# Patient Record
Sex: FEMALE | Race: ASIAN | NOT HISPANIC OR LATINO | Employment: FULL TIME | URBAN - METROPOLITAN AREA
[De-identification: names, ages, dates, MRNs, and addresses within clinical notes are randomized per-mention and may not be internally consistent; named-entity substitution may affect disease eponyms.]

---

## 2019-08-11 ENCOUNTER — APPOINTMENT (OUTPATIENT)
Dept: OBGYN | Facility: MEDICAL CENTER | Age: 39
End: 2019-08-11
Attending: OBSTETRICS & GYNECOLOGY
Payer: COMMERCIAL

## 2019-08-11 ENCOUNTER — ANESTHESIA (OUTPATIENT)
Dept: ANESTHESIOLOGY | Facility: MEDICAL CENTER | Age: 39
End: 2019-08-11
Payer: COMMERCIAL

## 2019-08-11 ENCOUNTER — HOSPITAL ENCOUNTER (INPATIENT)
Facility: MEDICAL CENTER | Age: 39
LOS: 4 days | End: 2019-08-15
Attending: OBSTETRICS & GYNECOLOGY | Admitting: OBSTETRICS & GYNECOLOGY
Payer: COMMERCIAL

## 2019-08-11 ENCOUNTER — ANESTHESIA EVENT (OUTPATIENT)
Dept: ANESTHESIOLOGY | Facility: MEDICAL CENTER | Age: 39
End: 2019-08-11
Payer: COMMERCIAL

## 2019-08-11 PROBLEM — I10 HTN (HYPERTENSION): Status: ACTIVE | Noted: 2019-08-11

## 2019-08-11 LAB
ABO GROUP BLD: NORMAL
ALBUMIN SERPL BCP-MCNC: 3.3 G/DL (ref 3.2–4.9)
ALBUMIN/GLOB SERPL: 1.1 G/DL
ALP SERPL-CCNC: 152 U/L (ref 30–99)
ALT SERPL-CCNC: 21 U/L (ref 2–50)
ANION GAP SERPL CALC-SCNC: 10 MMOL/L (ref 0–11.9)
AST SERPL-CCNC: 30 U/L (ref 12–45)
BASOPHILS # BLD AUTO: 0.2 % (ref 0–1.8)
BASOPHILS # BLD: 0.02 K/UL (ref 0–0.12)
BILIRUB SERPL-MCNC: 0.2 MG/DL (ref 0.1–1.5)
BUN SERPL-MCNC: 13 MG/DL (ref 8–22)
C TRACH DNA GENITAL QL NAA+PROBE: NEGATIVE
CALCIUM SERPL-MCNC: 9.8 MG/DL (ref 8.5–10.5)
CHLORIDE SERPL-SCNC: 108 MMOL/L (ref 96–112)
CO2 SERPL-SCNC: 18 MMOL/L (ref 20–33)
CREAT SERPL-MCNC: 0.66 MG/DL (ref 0.5–1.4)
EOSINOPHIL # BLD AUTO: 0.07 K/UL (ref 0–0.51)
EOSINOPHIL NFR BLD: 0.8 % (ref 0–6.9)
ERYTHROCYTE [DISTWIDTH] IN BLOOD BY AUTOMATED COUNT: 43.2 FL (ref 35.9–50)
GLOBULIN SER CALC-MCNC: 3 G/DL (ref 1.9–3.5)
GLUCOSE SERPL-MCNC: 108 MG/DL (ref 65–99)
HBV SURFACE AG SERPL QL IA: NEGATIVE
HCT VFR BLD AUTO: 37.2 % (ref 37–47)
HGB BLD-MCNC: 12.5 G/DL (ref 12–16)
HIV 1+2 AB+HIV1 P24 AG SERPL QL IA: NON REACTIVE
HOLDING TUBE BB 8507: NORMAL
IMM GRANULOCYTES # BLD AUTO: 0.07 K/UL (ref 0–0.11)
IMM GRANULOCYTES NFR BLD AUTO: 0.8 % (ref 0–0.9)
LYMPHOCYTES # BLD AUTO: 1.34 K/UL (ref 1–4.8)
LYMPHOCYTES NFR BLD: 15.6 % (ref 22–41)
MCH RBC QN AUTO: 30.7 PG (ref 27–33)
MCHC RBC AUTO-ENTMCNC: 33.6 G/DL (ref 33.6–35)
MCV RBC AUTO: 91.4 FL (ref 81.4–97.8)
MONOCYTES # BLD AUTO: 0.84 K/UL (ref 0–0.85)
MONOCYTES NFR BLD AUTO: 9.8 % (ref 0–13.4)
N GONORRHOEA DNA GENITAL QL NAA+PROBE: NEGATIVE
NEUTROPHILS # BLD AUTO: 6.24 K/UL (ref 2–7.15)
NEUTROPHILS NFR BLD: 72.8 % (ref 44–72)
NRBC # BLD AUTO: 0 K/UL
NRBC BLD-RTO: 0 /100 WBC
PLATELET # BLD AUTO: 166 K/UL (ref 164–446)
PMV BLD AUTO: 10.9 FL (ref 9–12.9)
POTASSIUM SERPL-SCNC: 3.5 MMOL/L (ref 3.6–5.5)
PROT SERPL-MCNC: 6.3 G/DL (ref 6–8.2)
RBC # BLD AUTO: 4.07 M/UL (ref 4.2–5.4)
RH BLD: NORMAL
RUBV IGG SERPL IA-ACNC: NORMAL
SODIUM SERPL-SCNC: 136 MMOL/L (ref 135–145)
STREP GP B DNA PCR: NEGATIVE
TREPONEMA PALLIDUM IGG+IGM AB [PRESENCE] IN SERUM OR PLASMA BY IMMUNOASSAY: NON REACTIVE
URATE SERPL-MCNC: 5.5 MG/DL (ref 1.9–8.2)
WBC # BLD AUTO: 8.6 K/UL (ref 4.8–10.8)

## 2019-08-11 PROCEDURE — 80053 COMPREHEN METABOLIC PANEL: CPT

## 2019-08-11 PROCEDURE — 770002 HCHG ROOM/CARE - OB PRIVATE (112)

## 2019-08-11 PROCEDURE — 700102 HCHG RX REV CODE 250 W/ 637 OVERRIDE(OP): Performed by: OBSTETRICS & GYNECOLOGY

## 2019-08-11 PROCEDURE — 85025 COMPLETE CBC W/AUTO DIFF WBC: CPT

## 2019-08-11 PROCEDURE — A9270 NON-COVERED ITEM OR SERVICE: HCPCS | Performed by: OBSTETRICS & GYNECOLOGY

## 2019-08-11 PROCEDURE — 84550 ASSAY OF BLOOD/URIC ACID: CPT

## 2019-08-11 PROCEDURE — 36415 COLL VENOUS BLD VENIPUNCTURE: CPT

## 2019-08-11 RX ORDER — IBUPROFEN 600 MG/1
600 TABLET ORAL EVERY 6 HOURS PRN
Status: DISCONTINUED | OUTPATIENT
Start: 2019-08-11 | End: 2019-08-15 | Stop reason: HOSPADM

## 2019-08-11 RX ORDER — ONDANSETRON 4 MG/1
4 TABLET, ORALLY DISINTEGRATING ORAL EVERY 6 HOURS PRN
Status: DISCONTINUED | OUTPATIENT
Start: 2019-08-11 | End: 2019-08-15 | Stop reason: HOSPADM

## 2019-08-11 RX ORDER — OXYCODONE AND ACETAMINOPHEN 10; 325 MG/1; MG/1
1 TABLET ORAL EVERY 4 HOURS PRN
Status: DISCONTINUED | OUTPATIENT
Start: 2019-08-11 | End: 2019-08-15 | Stop reason: HOSPADM

## 2019-08-11 RX ORDER — DEXTROSE, SODIUM CHLORIDE, SODIUM LACTATE, POTASSIUM CHLORIDE, AND CALCIUM CHLORIDE 5; .6; .31; .03; .02 G/100ML; G/100ML; G/100ML; G/100ML; G/100ML
INJECTION, SOLUTION INTRAVENOUS CONTINUOUS
Status: DISCONTINUED | OUTPATIENT
Start: 2019-08-12 | End: 2019-08-15 | Stop reason: HOSPADM

## 2019-08-11 RX ORDER — OXYCODONE HYDROCHLORIDE AND ACETAMINOPHEN 5; 325 MG/1; MG/1
1 TABLET ORAL EVERY 4 HOURS PRN
Status: DISCONTINUED | OUTPATIENT
Start: 2019-08-11 | End: 2019-08-15 | Stop reason: HOSPADM

## 2019-08-11 RX ORDER — NIFEDIPINE 30 MG/1
30 TABLET, EXTENDED RELEASE ORAL
Refills: 2 | Status: ON HOLD | COMMUNITY
Start: 2019-07-30 | End: 2019-08-14

## 2019-08-11 RX ORDER — MISOPROSTOL 200 UG/1
800 TABLET ORAL
Status: DISCONTINUED | OUTPATIENT
Start: 2019-08-11 | End: 2019-08-13 | Stop reason: HOSPADM

## 2019-08-11 RX ORDER — SODIUM CHLORIDE, SODIUM LACTATE, POTASSIUM CHLORIDE, CALCIUM CHLORIDE 600; 310; 30; 20 MG/100ML; MG/100ML; MG/100ML; MG/100ML
INJECTION, SOLUTION INTRAVENOUS CONTINUOUS
Status: ACTIVE | OUTPATIENT
Start: 2019-08-12 | End: 2019-08-12

## 2019-08-11 RX ORDER — ONDANSETRON 2 MG/ML
4 INJECTION INTRAMUSCULAR; INTRAVENOUS EVERY 6 HOURS PRN
Status: DISCONTINUED | OUTPATIENT
Start: 2019-08-11 | End: 2019-08-15 | Stop reason: HOSPADM

## 2019-08-11 RX ADMIN — MISOPROSTOL 25 MCG: 100 TABLET ORAL at 21:34

## 2019-08-11 ASSESSMENT — LIFESTYLE VARIABLES
TOTAL SCORE: 0
TOTAL SCORE: 0
HAVE PEOPLE ANNOYED YOU BY CRITICIZING YOUR DRINKING: NO
HAVE YOU EVER FELT YOU SHOULD CUT DOWN ON YOUR DRINKING: NO
EVER HAD A DRINK FIRST THING IN THE MORNING TO STEADY YOUR NERVES TO GET RID OF A HANGOVER: NO
TOTAL SCORE: 0
EVER FELT BAD OR GUILTY ABOUT YOUR DRINKING: NO
ALCOHOL_USE: NO
CONSUMPTION TOTAL: NEGATIVE
ON A TYPICAL DAY WHEN YOU DRINK ALCOHOL HOW MANY DRINKS DO YOU HAVE: 0
AVERAGE NUMBER OF DAYS PER WEEK YOU HAVE A DRINK CONTAINING ALCOHOL: 0
EVER_SMOKED: NEVER
HOW MANY TIMES IN THE PAST YEAR HAVE YOU HAD 5 OR MORE DRINKS IN A DAY: 0

## 2019-08-11 ASSESSMENT — COPD QUESTIONNAIRES
COPD SCREENING SCORE: 0
IN THE PAST 12 MONTHS DO YOU DO LESS THAN YOU USED TO BECAUSE OF YOUR BREATHING PROBLEMS: DISAGREE/UNSURE
DO YOU EVER COUGH UP ANY MUCUS OR PHLEGM?: NO/ONLY WITH OCCASIONAL COLDS OR INFECTIONS
DURING THE PAST 4 WEEKS HOW MUCH DID YOU FEEL SHORT OF BREATH: NONE/LITTLE OF THE TIME
HAVE YOU SMOKED AT LEAST 100 CIGARETTES IN YOUR ENTIRE LIFE: NO/DON'T KNOW

## 2019-08-11 ASSESSMENT — PATIENT HEALTH QUESTIONNAIRE - PHQ9
1. LITTLE INTEREST OR PLEASURE IN DOING THINGS: NOT AT ALL
2. FEELING DOWN, DEPRESSED, IRRITABLE, OR HOPELESS: NOT AT ALL
SUM OF ALL RESPONSES TO PHQ9 QUESTIONS 1 AND 2: 0

## 2019-08-11 NOTE — H&P
DATE OF PLANNED ADMISSION:  2019    ADMISSION DIAGNOSES:  Intrauterine pregnancy at term with chronic hypertension   for induction of labor.    HISTORY OF PRESENT ILLNESS:  This is a 38-year-old  who has an estimated   date of confinement of  based on a first trimester ultrasound.  The   patient is currently without complaints.  She reports good fetal movement, no   contractions, no vaginal bleeding or loss of fluid.    REVIEW OF SYSTEMS:  Negative for nausea, vomiting, headaches, fevers, chest   pains or shortness of breath.    PAST MEDICAL HISTORY:  Chronic hypertension.    PAST SURGICAL HISTORY:  None.    MEDICATIONS:  The patient is currently taking Procardia 30 mg XL daily as well   as a baby aspirin per day.    ALLERGIES:  SULFA.    SOCIAL HISTORY:  The patient denies tobacco, ethanol, or drugs.    PHYSICAL EXAMINATION:  GENERAL:  This is a well-developed, well-nourished female, in no apparent   distress.  VITAL SIGNS:  Blood pressure 102/70, weight 185 pounds.  HEART:  Regular rate and rhythm.  LUNGS:  Clear.  ABDOMEN:  Gravid with a fundal height of 38 cm.  EXTREMITIES:  Show no clubbing, cyanosis, or edema.  Most recent cervical exam   0, thick, and -2 station.    PERTINENT PRENATAL LABS:  The patient's blood type is A positive, antibody   screen negative, hepatitis negative, rubella immune, RPR negative, HIV   negative, group B strep is negative.  One-hour Glucola was normal at 133.    ASSESSMENT AND PLAN:  This is a 38-year-old G1 who is at 38+ weeks' gestation,   to be admitted for induction of labor secondary to chronic hypertension.  The   patient understands indications for induction and understands risks and   benefits.         ____________________________________     MD KENYON Rivero / GAURI    DD:  2019 13:33:33  DT:  2019 14:55:21    D#:  [id number]  Job#:  [id number]

## 2019-08-12 PROCEDURE — 500726 HCHG BAKRI TAPENADE BALLOON

## 2019-08-12 PROCEDURE — 770002 HCHG ROOM/CARE - OB PRIVATE (112)

## 2019-08-12 PROCEDURE — 700102 HCHG RX REV CODE 250 W/ 637 OVERRIDE(OP): Performed by: OBSTETRICS & GYNECOLOGY

## 2019-08-12 PROCEDURE — A9270 NON-COVERED ITEM OR SERVICE: HCPCS | Performed by: OBSTETRICS & GYNECOLOGY

## 2019-08-12 RX ADMIN — MISOPROSTOL 25 MCG: 100 TABLET ORAL at 01:36

## 2019-08-12 RX ADMIN — MISOPROSTOL 25 MCG: 100 TABLET ORAL at 05:59

## 2019-08-12 NOTE — PROGRESS NOTES
Comfortable  Cat one current but prev 2  freequent uc post cytotec  By my exam cl/th/high  /66   Pulse 76   Resp 16   Wt 83.9 kg (185 lb)   Dw exam and prev decel and cvx and reg uc and will recheck in hr and reassured re stable bps

## 2019-08-12 NOTE — ANESTHESIA PREPROCEDURE EVALUATION
37yo  at 38 1/7 weeks, here for IOL for chronic HTN; in labor pain and requesting epidural for pain control    Relevant Problems   CARDIAC   (+) HTN (hypertension) (takes nifedipine and baby ASA)       Physical Exam    Airway   Mallampati: II  TM distance: >3 FB  Neck ROM: full       Cardiovascular - normal exam  Rhythm: regular  Rate: normal  (-) murmur     Dental - normal exam           Pulmonary - normal exam  Breath sounds clear to auscultation     Abdominal    Neurological - normal exam               Anesthesia Plan    ASA 3 (HTN in parturient)       Plan - epidural   Neuraxial block will be labor analgesia              Pertinent diagnostic labs and testing reviewed    Informed Consent:    Anesthetic plan and risks discussed with patient.

## 2019-08-12 NOTE — PROGRESS NOTES
0700. Report from Sabi BLANC RN. POC discussed and resumed. Pt asleep at this time.    0948. Pt turned to LW. Dr. Chaudhary notified of decelerations, he will coma and check th ept.    0951. Dr. Chaudhary at the bedside, SVE closed.    1058. Per Dr. Chaudhary pt able to ambulate. Pt up to ambulate.    1141. Pt back to bed, EFM and TOCO applied. Pt turned to LW.    1300. Dr. Chaudhary placed cook balloon. 70 ml uterine and 70 ml vaginal.    1900. Report to Sabi BLANC RN. POC discussed.

## 2019-08-12 NOTE — PROGRESS NOTES
Comfortable  Cat one  Reg uc post cytotec x3  Ft/th/high  Cath place with 70 uterine/70 vaginal  /66   Pulse 76   Resp 16   Wt 83.9 kg (185 lb)   Dw balloon and uc and fetal status  All questions answered  Will follow

## 2019-08-12 NOTE — PROGRESS NOTES
Comfortable  Cat one  occ uc  Ft/th/high post cytotec#3  /69   Pulse 72   Resp 16   Wt 83.9 kg (185 lb)   Dw iol goals  All questions answered; will follow and prob balloon

## 2019-08-13 PROCEDURE — 304965 HCHG RECOVERY SERVICES

## 2019-08-13 PROCEDURE — 3E033VJ INTRODUCTION OF OTHER HORMONE INTO PERIPHERAL VEIN, PERCUTANEOUS APPROACH: ICD-10-PCS | Performed by: OBSTETRICS & GYNECOLOGY

## 2019-08-13 PROCEDURE — A9270 NON-COVERED ITEM OR SERVICE: HCPCS | Performed by: OBSTETRICS & GYNECOLOGY

## 2019-08-13 PROCEDURE — 10H07YZ INSERTION OF OTHER DEVICE INTO PRODUCTS OF CONCEPTION, VIA NATURAL OR ARTIFICIAL OPENING: ICD-10-PCS | Performed by: OBSTETRICS & GYNECOLOGY

## 2019-08-13 PROCEDURE — 303615 HCHG EPIDURAL/SPINAL ANESTHESIA FOR LABOR

## 2019-08-13 PROCEDURE — 700105 HCHG RX REV CODE 258: Performed by: OBSTETRICS & GYNECOLOGY

## 2019-08-13 PROCEDURE — 700105 HCHG RX REV CODE 258

## 2019-08-13 PROCEDURE — 3E0E7GC INTRODUCTION OF OTHER THERAPEUTIC SUBSTANCE INTO PRODUCTS OF CONCEPTION, VIA NATURAL OR ARTIFICIAL OPENING: ICD-10-PCS | Performed by: OBSTETRICS & GYNECOLOGY

## 2019-08-13 PROCEDURE — 10907ZC DRAINAGE OF AMNIOTIC FLUID, THERAPEUTIC FROM PRODUCTS OF CONCEPTION, VIA NATURAL OR ARTIFICIAL OPENING: ICD-10-PCS | Performed by: OBSTETRICS & GYNECOLOGY

## 2019-08-13 PROCEDURE — 700111 HCHG RX REV CODE 636 W/ 250 OVERRIDE (IP): Performed by: OBSTETRICS & GYNECOLOGY

## 2019-08-13 PROCEDURE — 700111 HCHG RX REV CODE 636 W/ 250 OVERRIDE (IP): Performed by: ANESTHESIOLOGY

## 2019-08-13 PROCEDURE — 0KQM0ZZ REPAIR PERINEUM MUSCLE, OPEN APPROACH: ICD-10-PCS | Performed by: OBSTETRICS & GYNECOLOGY

## 2019-08-13 PROCEDURE — 4A1H7CZ MONITORING OF PRODUCTS OF CONCEPTION, CARDIAC RATE, VIA NATURAL OR ARTIFICIAL OPENING: ICD-10-PCS | Performed by: OBSTETRICS & GYNECOLOGY

## 2019-08-13 PROCEDURE — 700111 HCHG RX REV CODE 636 W/ 250 OVERRIDE (IP)

## 2019-08-13 PROCEDURE — 59409 OBSTETRICAL CARE: CPT

## 2019-08-13 PROCEDURE — 700102 HCHG RX REV CODE 250 W/ 637 OVERRIDE(OP): Performed by: OBSTETRICS & GYNECOLOGY

## 2019-08-13 PROCEDURE — 770002 HCHG ROOM/CARE - OB PRIVATE (112)

## 2019-08-13 RX ORDER — ROPIVACAINE HYDROCHLORIDE 2 MG/ML
INJECTION, SOLUTION EPIDURAL; INFILTRATION
Status: COMPLETED | OUTPATIENT
Start: 2019-08-13 | End: 2019-08-13

## 2019-08-13 RX ORDER — CARBOPROST TROMETHAMINE 250 UG/ML
250 INJECTION, SOLUTION INTRAMUSCULAR
Status: DISCONTINUED | OUTPATIENT
Start: 2019-08-13 | End: 2019-08-15 | Stop reason: HOSPADM

## 2019-08-13 RX ORDER — SODIUM CHLORIDE, SODIUM LACTATE, POTASSIUM CHLORIDE, AND CALCIUM CHLORIDE .6; .31; .03; .02 G/100ML; G/100ML; G/100ML; G/100ML
250 INJECTION, SOLUTION INTRAVENOUS PRN
Status: CANCELLED | OUTPATIENT
Start: 2019-08-13

## 2019-08-13 RX ORDER — SODIUM CHLORIDE, SODIUM LACTATE, POTASSIUM CHLORIDE, CALCIUM CHLORIDE 600; 310; 30; 20 MG/100ML; MG/100ML; MG/100ML; MG/100ML
INJECTION, SOLUTION INTRAVENOUS CONTINUOUS
Status: DISCONTINUED | OUTPATIENT
Start: 2019-08-13 | End: 2019-08-15 | Stop reason: HOSPADM

## 2019-08-13 RX ORDER — SODIUM CHLORIDE, SODIUM LACTATE, POTASSIUM CHLORIDE, CALCIUM CHLORIDE 600; 310; 30; 20 MG/100ML; MG/100ML; MG/100ML; MG/100ML
INJECTION, SOLUTION INTRAVENOUS
Status: COMPLETED
Start: 2019-08-13 | End: 2019-08-13

## 2019-08-13 RX ORDER — SODIUM CHLORIDE, SODIUM LACTATE, POTASSIUM CHLORIDE, AND CALCIUM CHLORIDE .6; .31; .03; .02 G/100ML; G/100ML; G/100ML; G/100ML
1000 INJECTION, SOLUTION INTRAVENOUS
Status: CANCELLED | OUTPATIENT
Start: 2019-08-13

## 2019-08-13 RX ORDER — ROPIVACAINE HYDROCHLORIDE 2 MG/ML
INJECTION, SOLUTION EPIDURAL; INFILTRATION; PERINEURAL
Status: COMPLETED
Start: 2019-08-13 | End: 2019-08-13

## 2019-08-13 RX ORDER — SODIUM CHLORIDE, SODIUM LACTATE, POTASSIUM CHLORIDE, CALCIUM CHLORIDE 600; 310; 30; 20 MG/100ML; MG/100ML; MG/100ML; MG/100ML
INJECTION, SOLUTION INTRAVENOUS PRN
Status: DISCONTINUED | OUTPATIENT
Start: 2019-08-13 | End: 2019-08-15 | Stop reason: HOSPADM

## 2019-08-13 RX ORDER — MISOPROSTOL 200 UG/1
600 TABLET ORAL
Status: DISCONTINUED | OUTPATIENT
Start: 2019-08-13 | End: 2019-08-15 | Stop reason: HOSPADM

## 2019-08-13 RX ORDER — METHYLERGONOVINE MALEATE 0.2 MG/ML
0.2 INJECTION INTRAVENOUS
Status: DISCONTINUED | OUTPATIENT
Start: 2019-08-13 | End: 2019-08-15 | Stop reason: HOSPADM

## 2019-08-13 RX ORDER — ROPIVACAINE HYDROCHLORIDE 2 MG/ML
INJECTION, SOLUTION EPIDURAL; INFILTRATION; PERINEURAL CONTINUOUS
Status: CANCELLED | OUTPATIENT
Start: 2019-08-13

## 2019-08-13 RX ORDER — VITAMIN A ACETATE, BETA CAROTENE, ASCORBIC ACID, CHOLECALCIFEROL, .ALPHA.-TOCOPHEROL ACETATE, DL-, THIAMINE MONONITRATE, RIBOFLAVIN, NIACINAMIDE, PYRIDOXINE HYDROCHLORIDE, FOLIC ACID, CYANOCOBALAMIN, CALCIUM CARBONATE, FERROUS FUMARATE, ZINC OXIDE, CUPRIC OXIDE 3080; 12; 120; 400; 1; 1.84; 3; 20; 22; 920; 25; 200; 27; 10; 2 [IU]/1; UG/1; MG/1; [IU]/1; MG/1; MG/1; MG/1; MG/1; MG/1; [IU]/1; MG/1; MG/1; MG/1; MG/1; MG/1
1 TABLET, FILM COATED ORAL EVERY MORNING
Status: DISCONTINUED | OUTPATIENT
Start: 2019-08-14 | End: 2019-08-15 | Stop reason: HOSPADM

## 2019-08-13 RX ADMIN — SODIUM CHLORIDE, POTASSIUM CHLORIDE, SODIUM LACTATE AND CALCIUM CHLORIDE 1000 ML: 600; 310; 30; 20 INJECTION, SOLUTION INTRAVENOUS at 06:39

## 2019-08-13 RX ADMIN — SODIUM CHLORIDE, SODIUM LACTATE, POTASSIUM CHLORIDE, CALCIUM CHLORIDE 1000 ML: 600; 310; 30; 20 INJECTION, SOLUTION INTRAVENOUS at 01:30

## 2019-08-13 RX ADMIN — SODIUM CHLORIDE, POTASSIUM CHLORIDE, SODIUM LACTATE AND CALCIUM CHLORIDE 1000 ML: 600; 310; 30; 20 INJECTION, SOLUTION INTRAVENOUS at 01:30

## 2019-08-13 RX ADMIN — IBUPROFEN 600 MG: 600 TABLET ORAL at 20:25

## 2019-08-13 RX ADMIN — Medication 1 MILLI-UNITS/MIN: at 01:30

## 2019-08-13 RX ADMIN — SODIUM CHLORIDE, POTASSIUM CHLORIDE, SODIUM LACTATE AND CALCIUM CHLORIDE 1000 ML: 600; 310; 30; 20 INJECTION, SOLUTION INTRAVENOUS at 05:56

## 2019-08-13 RX ADMIN — FENTANYL CITRATE 100 MCG: 50 INJECTION, SOLUTION INTRAMUSCULAR; INTRAVENOUS at 06:22

## 2019-08-13 RX ADMIN — ROPIVACAINE HYDROCHLORIDE 4 ML: 2 INJECTION, SOLUTION EPIDURAL; INFILTRATION at 06:22

## 2019-08-13 RX ADMIN — SODIUM CHLORIDE, POTASSIUM CHLORIDE, SODIUM LACTATE AND CALCIUM CHLORIDE 1000 ML: 600; 310; 30; 20 INJECTION, SOLUTION INTRAVENOUS at 06:32

## 2019-08-13 RX ADMIN — SODIUM CHLORIDE, SODIUM LACTATE, POTASSIUM CHLORIDE, CALCIUM CHLORIDE AND DEXTROSE MONOHYDRATE: 5; 600; 310; 30; 20 INJECTION, SOLUTION INTRAVENOUS at 15:10

## 2019-08-13 RX ADMIN — ROPIVACAINE HYDROCHLORIDE 200 MG: 2 INJECTION, SOLUTION EPIDURAL; INFILTRATION at 06:13

## 2019-08-13 RX ADMIN — ROPIVACAINE HYDROCHLORIDE 200 MG: 2 INJECTION, SOLUTION EPIDURAL; INFILTRATION at 15:11

## 2019-08-13 RX ADMIN — Medication 125 ML/HR: at 21:12

## 2019-08-13 RX ADMIN — SODIUM CHLORIDE, SODIUM LACTATE, POTASSIUM CHLORIDE, CALCIUM CHLORIDE AND DEXTROSE MONOHYDRATE: 5; 600; 310; 30; 20 INJECTION, SOLUTION INTRAVENOUS at 08:28

## 2019-08-13 ASSESSMENT — PATIENT HEALTH QUESTIONNAIRE - PHQ9
2. FEELING DOWN, DEPRESSED, IRRITABLE, OR HOPELESS: NOT AT ALL
1. LITTLE INTEREST OR PLEASURE IN DOING THINGS: NOT AT ALL
SUM OF ALL RESPONSES TO PHQ9 QUESTIONS 1 AND 2: 0

## 2019-08-13 NOTE — PROGRESS NOTES
Labor Progress Note    Megan Nicole   38w3d      Subjective:  Bp has been good, procardia was held. \  Recurrent deep variables present after AROM -  Amnioinfusion was started  Pt comfortable with epidural.    Objective:   Vitals:    08/13/19 0715 08/13/19 0745 08/13/19 0800 08/13/19 0815   BP: 116/59 115/61 (!) 98/54 103/65   Pulse: 61 95 86 75   Resp:       Temp:       TempSrc:       SpO2:       Weight:       Height:         FHT: Category 2, mod variability, recurrent deep variables , undergoing amnioinfusion at this time. No late decels  Schwana: q2-6  (pitocin was turned off when deep variables were repetitive)  SVE: 4/80/-2  +caput  (stout 4)  Ext: no calf pain barbara LE    Labs:  No results found for this or any previous visit (from the past 24 hour(s)).    Assessment: Plan  38w3d  AMA  CHTN - stable and bp's good/procardia was held  IOL - s/p cytotec/stout balloon, now with AROM and deep variables. Amnioinfusion going. Will restart pitocin when recurrent deep variables have resolved.  Pt aware that she will need closer ctxs for her cervix to dilate/change but at this time amnioinfusion going in to try to alleviate the deep variables first. Will restart pitocin augmentation when safe to do so.   GBS negative.     Stephanie Joshua

## 2019-08-13 NOTE — PROGRESS NOTES
"Uncomfortable with uc  Considering epidural  Cat one  Reg uc on pit (post cytotec x 3 and catheter)  3-4/60/-2 with bbow  arom clear and iupc placed  /75   Pulse (!) 58   Temp 36.1 °C (96.9 °F) (Oral)   Resp 18   Ht 1.676 m (5' 6\")   Wt 83.9 kg (185 lb)   BMI 29.86 kg/m²    Hold bp meds  All questions answered  Will cont pit and get epidural   "

## 2019-08-13 NOTE — PROGRESS NOTES
Deep variables did resolve with positioning and amnioinfusion -   She was restarted on pitocin at 1:15pm and is now up to 3mu.  She had 2 spontaneous late decels - non-repetitive and none since that time.  PT comfortable with epidural - not feeling any of her ctxns and in good spirits.    vss afebrile  SVE: deferred  Orr: q3 min  FHT Category 2, moderate variability present and acels  Ext: no calf pain barbara LE    A/p  Continue augmentation.

## 2019-08-13 NOTE — PROGRESS NOTES
Late entry: Summary of events:    1900- Received report from FER Fiore RN. Cervical ripening balloon in place. Pt denies complaints or needs. Family at BS.    0537- Dr. Chaudhary at BS. AROM for clear fluid. SVE 4/60/-2. Pt desires epidural, pt prepped.    0637- Recurrent variables noted since AROM, position changes performed, fluid bolus infusing, pitocin off. Oxygen 10 l via mask. Dr. Chaudhary updated. Orders for amnioinfusion received.    0700- Report to DIEGO Lilly RN.    0730- Dr. Chaudhary reviewed tracing. Orders for 2oo ml amnioinfusion bolus, then run at 250 ml/hr received.

## 2019-08-13 NOTE — PROGRESS NOTES
0700- report received from ALVIN Luke RN. Pt resting comfortably. Denies needs at this time.     0730- Dr Chaudhary updated and reviewed strip. Orders for amnioinfusion bolus/maintenance.     0815- Dr Joshua at bedside. SVE with no cervical change noted. Orders for D5LR and reposition. Will restart pitocin when appropriate.     1315- Pitocin restarted (see mar).     1845- Dr Joshua updated. At Bedside. SVE at Lip/+2. Room set up for delivery.     1855- Report given to YARELIS Lauren RN.

## 2019-08-13 NOTE — ANESTHESIA PROCEDURE NOTES
Epidural Block  Performed by: Sabrina Rowe M.D.  Authorized by: Sabrina Rowe M.D.     Patient Location:  OB  Start Time:  8/13/2019 6:01 AM  End Time:  8/13/2019 6:12 AM  Reason for Block: labor analgesia    patient identified, IV checked, site marked, risks and benefits discussed, surgical consent, monitors and equipment checked, pre-op evaluation and timeout performed    Patient Position:  Sitting  Prep: ChloraPrep, patient draped and sterile technique    Monitoring:  Blood pressure, continuous pulse oximetry and heart rate  Approach:  Midline  Location:  L3-L4  Injection Technique:  SADAF saline  Skin infiltration:  Lidocaine  Strength:  1%  Dose:  3ml  Needle Type:  Tuohy  Needle Gauge:  17 G  Needle Length:  3.5 in  Loss of resistance::  4.5  Catheter Size:  19 G  Catheter at Skin Depth:  9.5  Test Dose:  Lidocaine 1.5% with epinephrine 1-to-200,000  Test Dose Result:  Negative   4 mL Ropivicaine with 4 mL NS

## 2019-08-14 LAB
ERYTHROCYTE [DISTWIDTH] IN BLOOD BY AUTOMATED COUNT: 43.6 FL (ref 35.9–50)
HCT VFR BLD AUTO: 34.9 % (ref 37–47)
HGB BLD-MCNC: 12 G/DL (ref 12–16)
MCH RBC QN AUTO: 31.6 PG (ref 27–33)
MCHC RBC AUTO-ENTMCNC: 34.4 G/DL (ref 33.6–35)
MCV RBC AUTO: 91.8 FL (ref 81.4–97.8)
PLATELET # BLD AUTO: 147 K/UL (ref 164–446)
PMV BLD AUTO: 11 FL (ref 9–12.9)
RBC # BLD AUTO: 3.8 M/UL (ref 4.2–5.4)
WBC # BLD AUTO: 14.2 K/UL (ref 4.8–10.8)

## 2019-08-14 PROCEDURE — 36415 COLL VENOUS BLD VENIPUNCTURE: CPT

## 2019-08-14 PROCEDURE — 770002 HCHG ROOM/CARE - OB PRIVATE (112)

## 2019-08-14 PROCEDURE — 700102 HCHG RX REV CODE 250 W/ 637 OVERRIDE(OP): Performed by: OBSTETRICS & GYNECOLOGY

## 2019-08-14 PROCEDURE — A9270 NON-COVERED ITEM OR SERVICE: HCPCS | Performed by: OBSTETRICS & GYNECOLOGY

## 2019-08-14 PROCEDURE — 85027 COMPLETE CBC AUTOMATED: CPT

## 2019-08-14 RX ORDER — IBUPROFEN 600 MG/1
600 TABLET ORAL EVERY 6 HOURS PRN
Qty: 30 TAB | Refills: 1 | Status: SHIPPED | OUTPATIENT
Start: 2019-08-14 | End: 2023-08-16

## 2019-08-14 RX ADMIN — VITAMIN A, VITAMIN C, VITAMIN D-3, VITAMIN E, VITAMIN B-1, VITAMIN B-2, NIACIN, VITAMIN B-6, CALCIUM, IRON, ZINC, COPPER 1 TABLET: 4000; 120; 400; 22; 1.84; 3; 20; 10; 1; 12; 200; 27; 25; 2 TABLET ORAL at 08:52

## 2019-08-14 ASSESSMENT — EDINBURGH POSTNATAL DEPRESSION SCALE (EPDS)
I HAVE FELT SCARED OR PANICKY FOR NO GOOD REASON: NO, NOT AT ALL
I HAVE BEEN ABLE TO LAUGH AND SEE THE FUNNY SIDE OF THINGS: AS MUCH AS I ALWAYS COULD
I HAVE BEEN SO UNHAPPY THAT I HAVE HAD DIFFICULTY SLEEPING: NOT AT ALL
THINGS HAVE BEEN GETTING ON TOP OF ME: NO, I HAVE BEEN COPING AS WELL AS EVER
I HAVE LOOKED FORWARD WITH ENJOYMENT TO THINGS: AS MUCH AS I EVER DID
THE THOUGHT OF HARMING MYSELF HAS OCCURRED TO ME: NEVER
I HAVE BLAMED MYSELF UNNECESSARILY WHEN THINGS WENT WRONG: NO, NEVER
I HAVE FELT SAD OR MISERABLE: NO, NOT AT ALL
I HAVE BEEN ANXIOUS OR WORRIED FOR NO GOOD REASON: NO, NOT AT ALL
I HAVE BEEN SO UNHAPPY THAT I HAVE BEEN CRYING: NO, NEVER

## 2019-08-14 ASSESSMENT — PAIN SCALES - GENERAL: PAIN_LEVEL: 0

## 2019-08-14 NOTE — PROGRESS NOTES
Post Partum Progress Note    Name:   Megan Nicole   Date/Time:  8/14/2019 - 9:19 AM  Chief Admitting Dx:  Pregnancy  Labor and delivery, indication for care  Delivery Type:  vaginal, spontaneous  Post-Op/Post Partum Days #:  1    Subjective:  Aambulating, voiding, tolerating diet, normal lochia, pain controlled.     Vitals:    08/13/19 2100 08/13/19 2212 08/14/19 0200 08/14/19 0600   BP: 120/78 123/79 123/80 108/72   Pulse: 86 80 90 99   Resp:  18 15 16   Temp:  37.4 °C (99.4 °F) 36.9 °C (98.4 °F) 37.3 °C (99.2 °F)   TempSrc:  Temporal Temporal Temporal   SpO2:  97% 93% 95%   Weight:       Height:           Exam:  Gen: no distress  Abd: soft nt/nd firm fundus  Ext: 1+ edema barbara LE    Meds:  Current Facility-Administered Medications   Medication Dose   • lactated ringers infusion     • oxytocin (PITOCIN) 20 UNITS/1000ML LR (induction of labor)  1 kye-units/min   • LR infusion     • miSOPROStol (CYTOTEC) tablet 600 mcg  600 mcg   • PRN oxytocin (PITOCIN) (20 Units/1000 mL) PRN for excessive uterine bleeding - See Admin Instr  125-999 mL/hr   • methylergonovine (METHERGINE) injection 0.2 mg  0.2 mg   • carboPROST (HEMABATE) injection 250 mcg  250 mcg   • prenatal plus vitamin (STUARTNATAL 1+1) 27-1 MG tablet 1 Tab  1 Tab   • D5LR infusion     • ondansetron (ZOFRAN ODT) dispertab 4 mg  4 mg    Or   • ondansetron (ZOFRAN) syringe/vial injection 4 mg  4 mg   • oxytocin (PITOCIN) infusion (for postpartum)   mL/hr   • ibuprofen (MOTRIN) tablet 600 mg  600 mg   • oxyCODONE-acetaminophen (PERCOCET) 5-325 MG per tablet 1 Tab  1 Tab   • oxyCODONE-acetaminophen (PERCOCET-10)  MG per tablet 1 Tab  1 Tab   • miSOPROStol (CYTOTEC) tablet 25 mcg  25 mcg       Labs:   Recent Labs     08/11/19  2100 08/14/19  0510   WBC 8.6 14.2*   RBC 4.07* 3.80*   HEMOGLOBIN 12.5 12.0   HEMATOCRIT 37.2 34.9*   MCV 91.4 91.8   MCH 30.7 31.6   MCHC 33.6 34.4   RDW 43.2 43.6   PLATELETCT 166 147*   MPV 10.9 11.0       Assessment:  Ppd  1  Plan:  Gest htn - long iol but not requiring any bp meds since admission. Will keep her off of them as her bp is low.   Discharge home  Encourage ambulation  Pelvic rest, no heavy lifting/pulling /pushing  F/u 6 weeks postpartum  Continue prenatal vitamins daily  Give Rhogam if Rh negative and indicated  Give MMR if indcated prior to discharge  Encourage breastfeeding    Stephanie Joshua M.D.

## 2019-08-14 NOTE — ANESTHESIA TIME REPORT
Anesthesia Start and Stop Event Times     Date Time Event    8/13/2019 0554 Ready for Procedure     0554 Anesthesia Start     1935 Anesthesia Stop        Responsible Staff  08/13/19    Name Role Begin End    Sabrina Rowe M.D. Anesth 0554 0705    Harry Mohamud M.D. Anesth 0705 1935        Preop Diagnosis (Free Text):  Pre-op Diagnosis             Preop Diagnosis (Codes):    Post op Diagnosis  Pregnancy  labor pain    Premium Reason  A. 3PM - 7AM    Comments:

## 2019-08-14 NOTE — L&D DELIVERY NOTE
DATE OF SERVICE:  08/13/2019    PROCEDURE PERFORMED:  Normal spontaneous vaginal delivery with repair of   second-degree laceration.    PROCEDURE IN DETAIL:  This patient was admitted for induction of labor   secondary to chronic hypertension at term.  She was given Cytotec x3 and also   had a Cook balloon placed.  She then underwent artificial rupture of membranes   and progressed with minimal Pitocin augmentation to complete-complete and +2   station.  The patient pushed until the head was delivered in an OA position   over an intact perineum.  There was a nuchal cord x1, which was reduced.  The   anterior and posterior shoulders were then delivered without difficulty with   the rest of body to follow.  The male with Apgars of 8 and 9 was placed on the   maternal abdomen.  After approximately 2 minutes, the cord was clamped x2 and   cut and the infant was handed to awaiting nursing staff.  The placenta   delivered spontaneously, Bernardo intact with a 3-vessel cord.  The   second-degree midline perineal laceration was repaired with a 2-0 chromic in a   standard fashion.  The estimated blood loss was 300 mL.  The mother is to   recover in labor and delivery and the infant will be transferred to the   well-baby nursery.       ____________________________________     MD KENYON Rivero / GAURI    DD:  08/13/2019 19:59:19  DT:  08/14/2019 03:48:19    D#:  [id number]  Job#:  [id number]

## 2019-08-14 NOTE — PROGRESS NOTES
Pt arrived to S323 via wheelchair with L&D RN. Report received from BRANDEN Christianson. Assessment completed. Denies pain at this time. Pt oriented to room, call light, infant security, emergency light, and unit routine. Encouraged to call for assistance.

## 2019-08-14 NOTE — PROGRESS NOTES
- Report received from BRANDEN Lilly. Pt is resting in bed with FOB at side. Dr Joshua is in the room and will re-assess pt once the room is ready for delivery. Previous SVE was ant-lip. Epidural in place and working well. Pitocin running at 4 kye-units/min. D5LR at 125 ml/hr. Gonzalez in place and draining bloody urine to gravity. Gonzalez bulb drained in preparation for pushing. POC discussed and assumed.   - Dr Joshua and room are ready for delivery. Pt educated on pushing technique and pushing started with pt in right wedge and stirrups.   - Dr Dash in room, assumed pt care and will take Dr Joshua's place with pushing and delivery.   - Room preped for potential vacuum. RT, Tech and pickup RN in room   -  of viable male, apgars 8/9. Cord gasses collected.   - Delivery of intact placenta. Pitocin bolus running.   - Pt repositioned for comfort with ice packs applied to vagina. Baby skin to skin with mom.  - Pt assisted up to BR and voided successfully. Gown and new pads applied.   - Pt transferred to PPU via wheelchair with infant in arms. Bedside report given to BRANDEN Mobley.

## 2019-08-14 NOTE — LACTATION NOTE
This note was copied from a baby's chart.  Baby 38.3 weeks, , 16 hours old, couplet to be discharged today. Baby swaddled in crib, mother reports baby has been latching. Baby showing hunger cues, asked mother if I could help her latch, reinforced hunger cues. Assisted baby to breast using cross cradle hold, difficult for baby to sustain deep latch. Repositioned mother to laid back, baby able to sustain latch longer. Mother able to feel difference of shallow vs deep latch, mother independently latching deeply. Mother watched [email or web address] hand expression video, demo done with LC (hand expressed 1 ml then spoon fed back). Contact information given on outpatient [facility] & invited to [facility] [place].     Teaching on hunger cues, breastfeeding when baby shows cues or by 3 hours from last feed, importance of skin to skin, positioning baby at breast, cluster feeding & hand expression then spoon feed back.    Breastfeeding POC:  Breastfeed on demand or by 3 hours from last feed, hand express & spoon feed back after each breastfeed.F/U with [facility] for outpatient lactation support.

## 2019-08-14 NOTE — ANESTHESIA POSTPROCEDURE EVALUATION
Patient: Megan Nicole    Procedure Summary     Date:  08/13/19 Room / Location:      Anesthesia Start:  0554 Anesthesia Stop:  1935    Procedure:  Labor Epidural Diagnosis:      Scheduled Providers:   Responsible Provider:  Harry Mohamud M.D.    Anesthesia Type:  epidural ASA Status:  3          Final Anesthesia Type: epidural  Last vitals  BP   Blood Pressure: 123/79    Temp   37.4 °C (99.4 °F)    Pulse   Pulse: 80   Resp   18    SpO2   97 %      Anesthesia Post Evaluation    Patient location during evaluation: PACU  Patient participation: complete - patient participated  Level of consciousness: awake and alert  Pain score: 0    Airway patency: patent  Anesthetic complications: no  Cardiovascular status: hemodynamically stable  Respiratory status: acceptable  Hydration status: euvolemic    PONV: none           Nurse Pain Score: 4 (NPRS)

## 2019-08-15 VITALS
HEART RATE: 76 BPM | HEIGHT: 66 IN | WEIGHT: 185 LBS | OXYGEN SATURATION: 96 % | TEMPERATURE: 98.7 F | BODY MASS INDEX: 29.73 KG/M2 | RESPIRATION RATE: 17 BRPM | DIASTOLIC BLOOD PRESSURE: 76 MMHG | SYSTOLIC BLOOD PRESSURE: 127 MMHG

## 2019-08-15 PROCEDURE — A9270 NON-COVERED ITEM OR SERVICE: HCPCS | Performed by: OBSTETRICS & GYNECOLOGY

## 2019-08-15 PROCEDURE — 700102 HCHG RX REV CODE 250 W/ 637 OVERRIDE(OP): Performed by: OBSTETRICS & GYNECOLOGY

## 2019-08-15 RX ADMIN — VITAMIN A, VITAMIN C, VITAMIN D-3, VITAMIN E, VITAMIN B-1, VITAMIN B-2, NIACIN, VITAMIN B-6, CALCIUM, IRON, ZINC, COPPER 1 TABLET: 4000; 120; 400; 22; 1.84; 3; 20; 10; 1; 12; 200; 27; 25; 2 TABLET ORAL at 06:18

## 2019-08-15 RX ADMIN — IBUPROFEN 600 MG: 600 TABLET ORAL at 06:18

## 2019-08-15 NOTE — PROGRESS NOTES
Discharge teaching reviewed with patient, all questions answered.  Pt has all written information on self and infant care, including prescriptions,  screening slip and information, and follow-up instructions. Pt in apparent stable condition for discharge, showing no s/s of distress.  Pt taken out of hospital in w/c with infant and FOB, escorted by CNA.

## 2019-08-15 NOTE — PROGRESS NOTES
Assumed care of patient, report at bedside from, Katy OTERO. Assessment completed and WDL. Call light within reach, discussed plan of care, denies pain at the moment. Will continue to monitor.

## 2019-08-15 NOTE — DISCHARGE INSTRUCTIONS
POSTPARTUM DISCHARGE INSTRUCTIONS FOR MOM    YOB: 1980   Age: 38 y.o.               Admit Date: 8/11/2019     Discharge Date: 8/14/2019  Attending Doctor:  Milly Dash M.D.                  Allergies:  Patient has no known allergies.    Discharged to home by car. Discharged via wheelchair, hospital escort: Yes.  Special equipment needed: Not Applicable  Belongings with: Personal  Be sure to schedule a follow-up appointment with your primary care doctor or any specialists as instructed.     Discharge Plan:   Diet Plan: Discussed  Activity Level: Discussed  Confirmed Follow up Appointment: Patient to Call and Schedule Appointment  Confirmed Symptoms Management: Discussed  Medication Reconciliation Updated: Yes  Influenza Vaccine Indication: Indicated: Not available from distributor/    REASONS TO CALL YOUR OBSTETRICIAN:  1.   Persistent fever or shaking chills (Temperature higher than 100.4)  2.   Heavy bleeding (soaking more than 1 pad per hour); Passing clots  3.   Foul odor from vagina  4.   Mastitis (Breast infection; breast pain, chills, fever, redness)  5.   Urinary pain, burning or frequency  6.   Episiotomy infection  7.   Severe depression longer than 24 hours    HAND WASHING  · Prior to handling the baby.  · Before breastfeeding or bottle feeding baby.  · After using the bathroom or changing the baby's diaper.    WOUND CARE  Ask your physician for additional care instructions.  In general:       · Keep clean and dry.    · Do NOT lift anything heavier than your baby for up to 6 weeks.    · There should not be any opening or pus.      VAGINAL CARE  · Nothing inside vagina for 6 weeks: no sexual intercourse, tampons or douching.  · Bleeding may continue for 2-4 weeks.  Amount may vary.    · Call your physician for heavy bleeding which means soaking more than 1 pad per hour    BIRTH CONTROL  · It is possible to become pregnant at any time after delivery and while  "breastfeeding.  · Plan to discuss a method of birth control with your physician at your follow up visit. visit.    DIET AND ELIMINATION  · Eating more fiber (bran cereal, fruits, and vegetables) and drinking plenty of fluids will help to avoid constipation.  · Urinary frequency after childbirth is normal.    POSTPARTUM BLUES  During the first few days after birth, you may experience a sense of the \"blues\" which may include impatience, irritability or even crying.  These feeling come and go quickly.  However, as many as 1 in 10 women experience emotional symptoms known as postpartum depression.    Postpartum depression:  May start as early as the second or third day after delivery or take several weeks or months to develop.  Symptoms of \"blues\" are present, but are more intense:  Crying spells; loss of appetite; feelings of hopelessness or loss of control; fear of touching the baby; over concern or no concern at all about the baby; little or no concern about your own appearance/caring for yourself; and/or inability to sleep or excessive sleeping.  Contact your physician if you are experiencing any of these symptoms.    Crisis Hotline:  · [place] Crisis Hotline:  5-777-LABPVVB  Or 1-882.202.3363  · [place] Crisis Hotline:  1-186.681.6792  Or 897-494-1250    PREVENTING SHAKEN BABY:  If you are angry or stressed, PUT THE BABY IN THE CRIB, step away, take some deep breaths, and wait until you are calm to care for the baby.  DO NOT SHAKE THE BABY.  You are not alone, call a supporter for help.    · Crisis [facility] Center 24/7 crisis line 672-700-6758 or 1-531.396.8298  · You can also text them, text \"ANSWER\" to 379664    QUIT SMOKING/TOBACCO USE:  I understand the use of any tobacco products increases my chance of suffering from future heart disease and could cause other illnesses which may shorten my life. Quitting the use of tobacco products is the single most important thing I can do to improve my health. For further " information on smoking / tobacco cessation call a Toll Free Quit Line at 1-990.755.1925 (*[employer] [place]) or 1-598.518.7663 (American [employer] Association) or you can access the web based program at www.lungusa.org.    · [place] Tobacco Users Help Line:  (998) 149-9764       Toll Free: 1-395.798.5533  · Quit Tobacco Program [facility] (038)054-2580    DEPRESSION / SUICIDE RISK:  As you are discharged from this [facility], it is important to learn how to keep safe from harming yourself.    Recognize the warning signs:  · Abrupt changes in personality, positive or negative- including increase in energy   · Giving away possessions  · Change in eating patterns- significant weight changes-  positive or negative  · Change in sleeping patterns- unable to sleep or sleeping all the time   · Unwillingness or inability to communicate  · Depression  · Unusual sadness, discouragement and loneliness  · Talk of wanting to die  · Neglect of personal appearance   · Rebelliousness- reckless behavior  · Withdrawal from people/activities they love  · Confusion- inability to concentrate     If you or a loved one observes any of these behaviors or has concerns about self-harm, here's what you can do:  · Talk about it- your feelings and reasons for harming yourself  · Remove any means that you might use to hurt yourself (examples: pills, rope, extension cords, firearm)  · Get professional help from the community (Mental Health, Substance Abuse, psychological counseling)  · Do not be alone:Call your Safe Contact- someone whom you trust who will be there for you.  · Call your local CRISIS HOTLINE 134-2386 or 535-819-7524  · Call your local Children's Mobile Crisis Response Team [place] (139) 182-4523 or www.[employer]  · Call the toll free National Suicide Prevention Hotlines   · National Suicide Prevention Lifeline 399-669-UXYS (1411)  · National Hope Line Network 800-SUICIDE  (993-8065)    DISCHARGE SURVEY:  Thank you for choosing [facility].  We hope we provided you with very good care.  You may be receiving a survey in the mail.  Please fill it out.  Your opinion is valuable to us.    ADDITIONAL EDUCATIONAL MATERIALS GIVEN TO PATIENT:        My signature on this form indicates that:  1.  I have reviewed and understand the above information  2.  My questions regarding this information have been answered to my satisfaction.  3.  I have formulated a plan with my discharge nurse to obtain my prescribed medication for home.

## 2019-08-15 NOTE — CARE PLAN
Problem: Potential for postpartum infection related to presence of episiotomy/vaginal tear and/or uterine contamination  Goal: Patient will be absent from signs and symptoms of infection  Outcome: PROGRESSING AS EXPECTED  Note:   No s/s of infection, VSS     Problem: Potential knowledge deficit related to lack of understanding of self and  care  Goal: Patient will demonstrate ability to care for self and infant  Outcome: PROGRESSING AS EXPECTED  Note:   Patient able to care for self and infant appropriately

## 2019-08-15 NOTE — DISCHARGE SUMMARY
DATE OF ADMISSION:  08/11/2019    DATE OF DISCHARGE:  08/15/2019    ADMITTING DIAGNOSES:  1.  Intrauterine pregnancy at 38 weeks gestation.  2.  Chronic hypertension.  3.  Advanced maternal age.    DISCHARGE DIAGNOSES:  1.  Intrauterine pregnancy at 38 weeks gestation.  2.  Chronic hypertension.  3.  Advanced maternal age.  4.  Status post normal spontaneous vaginal delivery.    HOSPITAL COURSE:  The patient was admitted and underwent vaginal delivery,   uncomplicated.  She is postpartum day #2 and stable for discharge home.  She   was written to be discharged home yesterday, but decided to stay an extra day.    Her discharge medications have already been done by Dr. Joshua.  She will   follow up with Dr. Dsah in 6 weeks' time.  At this point, she is going to   stay off her Procardia as her blood pressures have all been within normal   limits and she will check her blood pressures at home and follow up with her   primary care physician.  She is aware of this.       ____________________________________     Corinne E. Capurro, MD [facility] / GAURI    DD:  08/15/2019 05:30:19  DT:  08/15/2019 06:18:04    D#:  [id number]  Job#:  [id number]

## 2019-08-15 NOTE — PROGRESS NOTES
Report received. Assessment complete. Lochia scant, fundus firm. Declines intervention for pain at this time. Call light within reach, encouraged to call with any needs.

## 2019-08-15 NOTE — DISCHARGE SUMMARY
DATE OF ADMISSION:  08/11/2019.    DATE OF DISCHARGE:  08/14/2019.    PRINCIPAL DIAGNOSES:  1.  Intrauterine pregnancy at 38+ weeks' gestation.  2.  Chronic hypertension.  3.  Advanced maternal age.    PRINCIPAL PROCEDURES:  1.  Induction of labor.  2.  Epidural anesthesia.  3.  Normal spontaneous vaginal delivery.    HOSPITAL COURSE:  Patient arrived for scheduled induction of labor secondary   to chronic hypertension at term.  She is currently taking Procardia and baby   aspirin daily.  She received Cytotec.  She received Gonzalez Cook catheter   balloon.  She was then augmented with Pitocin.  She received Pitocin   augmentation thereafter an epidural.  She had artificial rupture of membranes   and was internalized.  She had a long second stage of labor secondary to the   fact the baby was having repetitive variable decels, which required turning   down her Pitocin several times during the day.  Additionally, she received an   amnioinfusion.  She eventually got to complete.  She pushed and delivered a   male infant.  Apgars were 8 and 9.  Weight was 3070 g.  By postpartum day #1,   she is ambulating, voiding, tolerating regular diet.  The pain is controlled   with p.o. pain medication.  She desires to be discharged home.  She will be   discharged home with ibuprofen and prenatal vitamins.  She will follow up with   Dr. Dash in 6 weeks, sooner if needed.  Verbal instructions given and all   questions have been answered.  She will stay off of her Procardia, as blood   pressures are low enough to not need them at this point in time, but she will   need to check her blood pressure at home to see if it starts elevating   throughout the postpartum period.  She is aware of this.  She is A positive,   rubella is immune, and her postoperative hematocrit was 34.9.       ____________________________________     MD WESTLEY DIAZ / GAURI    DD:  08/14/2019 09:23:59  DT:  08/15/2019 02:06:30    D#:  [id number]  Job#:   [id number]

## 2019-08-15 NOTE — CARE PLAN
Problem: Alteration in comfort related to episiotomy, vaginal repair and/or after birth pains  Goal: Patient is able to ambulate, care for self and infant  Outcome: PROGRESSING AS EXPECTED  Note:   Pt able to care for self and infant.   Goal: Patient verbalizes acceptable pain level  Outcome: PROGRESSING AS EXPECTED  Note:   Pain controlled with prn medications per mar. Pt will call to request pain medications. Will offer pain medications as they become available. Pain management expectations discussed with pt, plan made for the day regarding how to address pain.

## 2021-01-21 LAB — STREP GP B DNA PCR: NEGATIVE

## 2021-02-09 ENCOUNTER — HOSPITAL ENCOUNTER (OUTPATIENT)
Dept: OBGYN | Facility: MEDICAL CENTER | Age: 41
End: 2021-02-09
Attending: OBSTETRICS & GYNECOLOGY
Payer: COMMERCIAL

## 2021-02-09 LAB
SARS-COV-2 RNA RESP QL NAA+PROBE: NOTDETECTED
SPECIMEN SOURCE: NORMAL

## 2021-02-09 PROCEDURE — U0003 INFECTIOUS AGENT DETECTION BY NUCLEIC ACID (DNA OR RNA); SEVERE ACUTE RESPIRATORY SYNDROME CORONAVIRUS 2 (SARS-COV-2) (CORONAVIRUS DISEASE [COVID-19]), AMPLIFIED PROBE TECHNIQUE, MAKING USE OF HIGH THROUGHPUT TECHNOLOGIES AS DESCRIBED BY CMS-2020-01-R: HCPCS

## 2021-02-09 PROCEDURE — U0005 INFEC AGEN DETEC AMPLI PROBE: HCPCS

## 2021-02-11 NOTE — H&P
DATE OF ADMISSION:  02/12/2021     ADMISSION DIAGNOSES:  Intrauterine pregnancy at term, history of chronic   hypertension.     HISTORY OF PRESENT ILLNESS:  This is a 40-year-old G2, P1-0-0-1, who has an   estimated date of delivery of 02/17/2021 based on first trimester ultrasound   consistent with her last menstrual cycle.  The patient has a medical history   of chronic hypertension, which is mild and well controlled.  She is scheduled   for induction of labor secondary to those medical indications.  She is   currently without complaints.  She reports good fetal movement.  Denies   vaginal bleeding, contractions, loss of fluid.     REVIEW OF SYSTEMS:  Negative for nausea, vomiting, chest pain, shortness of   breath or fever.     PAST MEDICAL HISTORY:  Chronic hypertension.     PAST SURGICAL HISTORY:  None.     OBSTETRICAL HISTORY:  Previous vaginal delivery of a male, 6 pounds and 12   ounces, 08/2019.     MEDICATIONS:  Procardia 30 XL p.o. q. day.     ALLERGIES:  SULFA.     SOCIAL HISTORY:  The patient denies tobacco, ethanol or drugs.     PHYSICAL EXAMINATION:    GENERAL:  She is a well-developed, well-nourished female, in no apparent   distress.  VITAL SIGNS:  Weight 195.  Blood pressure 130/82.  HEART:  Regular rate and rhythm.  LUNGS:  Clear.  ABDOMEN:  Gravid and soft with the fundal height of 38 cm.  EXTREMITIES:  Show no clubbing, cyanosis or edema.  PELVIC:  Cervix is closed, thick and high, vertex presentation.  Fetal heart   tones are documented in the 130s.     PERTINENT LABORATORY DATA:  The patient had noninvasive prenatal screening,   which was normal for a male fetus.  She also had a normal MSAFP.  Her 1-hour   Glucola was 148.  She had a normal 3-hour GTT.  Group B strep culture is   negative.  Blood type is A positive, hepatitis B negative, RPR nonreactive,   rubella immune, HIV negative.     DIAGNOSTIC DATA:  Her ultrasound originally showed renal pyelectasis, which   resolved at 28 weeks.      ASSESSMENT AND PLAN:  A 40-year-old G2, P1 who will be at 39 weeks and 2 days   with a history of chronic hypertension, who is scheduled for induction of   labor secondary to chronic hypertension.  The patient is scheduled to come in   at 10 p.m. on 02/12/2021.  She is scheduled for Cytotec.  Intrauterine   gestation at 39+ weeks for induction of labor secondary to chronic   hypertension.        ______________________________  MD KENYON Rivero/ROSETTE/DOTTIE    DD:  02/10/2021 13:58  DT:  02/10/2021 14:47    Job#:  [id number]

## 2021-02-12 ENCOUNTER — HOSPITAL ENCOUNTER (OUTPATIENT)
Dept: OBGYN | Facility: MEDICAL CENTER | Age: 41
End: 2021-02-12
Payer: COMMERCIAL

## 2021-02-12 ENCOUNTER — HOSPITAL ENCOUNTER (INPATIENT)
Facility: MEDICAL CENTER | Age: 41
LOS: 2 days | End: 2021-02-14
Attending: OBSTETRICS & GYNECOLOGY | Admitting: OBSTETRICS & GYNECOLOGY
Payer: COMMERCIAL

## 2021-02-12 DIAGNOSIS — G89.18 POSTOPERATIVE PAIN: ICD-10-CM

## 2021-02-12 LAB
ERYTHROCYTE [DISTWIDTH] IN BLOOD BY AUTOMATED COUNT: 40.8 FL (ref 35.9–50)
HCT VFR BLD AUTO: 38.1 % (ref 37–47)
HGB BLD-MCNC: 13.2 G/DL (ref 12–16)
HOLDING TUBE BB 8507: NORMAL
MCH RBC QN AUTO: 31.4 PG (ref 27–33)
MCHC RBC AUTO-ENTMCNC: 34.6 G/DL (ref 33.6–35)
MCV RBC AUTO: 90.5 FL (ref 81.4–97.8)
PLATELET # BLD AUTO: 200 K/UL (ref 164–446)
PMV BLD AUTO: 10.4 FL (ref 9–12.9)
RBC # BLD AUTO: 4.21 M/UL (ref 4.2–5.4)
WBC # BLD AUTO: 11.8 K/UL (ref 4.8–10.8)

## 2021-02-12 PROCEDURE — A9270 NON-COVERED ITEM OR SERVICE: HCPCS | Performed by: OBSTETRICS & GYNECOLOGY

## 2021-02-12 PROCEDURE — 700102 HCHG RX REV CODE 250 W/ 637 OVERRIDE(OP): Performed by: OBSTETRICS & GYNECOLOGY

## 2021-02-12 PROCEDURE — 770002 HCHG ROOM/CARE - OB PRIVATE (112)

## 2021-02-12 PROCEDURE — 85027 COMPLETE CBC AUTOMATED: CPT

## 2021-02-12 RX ORDER — CARBOPROST TROMETHAMINE 250 UG/ML
250 INJECTION, SOLUTION INTRAMUSCULAR
Status: DISCONTINUED | OUTPATIENT
Start: 2021-02-12 | End: 2021-02-13 | Stop reason: HOSPADM

## 2021-02-12 RX ORDER — OXYCODONE HYDROCHLORIDE AND ACETAMINOPHEN 5; 325 MG/1; MG/1
1 TABLET ORAL EVERY 4 HOURS PRN
Status: DISCONTINUED | OUTPATIENT
Start: 2021-02-12 | End: 2021-02-14 | Stop reason: HOSPADM

## 2021-02-12 RX ORDER — IBUPROFEN 600 MG/1
600 TABLET ORAL EVERY 6 HOURS PRN
Status: DISCONTINUED | OUTPATIENT
Start: 2021-02-12 | End: 2021-02-14 | Stop reason: HOSPADM

## 2021-02-12 RX ORDER — SODIUM CHLORIDE, SODIUM LACTATE, POTASSIUM CHLORIDE, CALCIUM CHLORIDE 600; 310; 30; 20 MG/100ML; MG/100ML; MG/100ML; MG/100ML
INJECTION, SOLUTION INTRAVENOUS CONTINUOUS
Status: ACTIVE | OUTPATIENT
Start: 2021-02-12 | End: 2021-02-13

## 2021-02-12 RX ORDER — NIFEDIPINE 30 MG/1
30 TABLET, EXTENDED RELEASE ORAL DAILY
COMMUNITY

## 2021-02-12 RX ORDER — ONDANSETRON 2 MG/ML
4 INJECTION INTRAMUSCULAR; INTRAVENOUS EVERY 6 HOURS PRN
Status: DISCONTINUED | OUTPATIENT
Start: 2021-02-12 | End: 2021-02-14 | Stop reason: HOSPADM

## 2021-02-12 RX ORDER — OXYCODONE HYDROCHLORIDE AND ACETAMINOPHEN 5; 325 MG/1; MG/1
2 TABLET ORAL EVERY 4 HOURS PRN
Status: DISCONTINUED | OUTPATIENT
Start: 2021-02-12 | End: 2021-02-14 | Stop reason: HOSPADM

## 2021-02-12 RX ORDER — MISOPROSTOL 200 UG/1
800 TABLET ORAL
Status: COMPLETED | OUTPATIENT
Start: 2021-02-12 | End: 2021-02-13

## 2021-02-12 RX ORDER — ONDANSETRON 4 MG/1
4 TABLET, ORALLY DISINTEGRATING ORAL EVERY 6 HOURS PRN
Status: DISCONTINUED | OUTPATIENT
Start: 2021-02-12 | End: 2021-02-14 | Stop reason: HOSPADM

## 2021-02-12 RX ADMIN — MISOPROSTOL 25 MCG: 100 TABLET ORAL at 23:00

## 2021-02-12 ASSESSMENT — COPD QUESTIONNAIRES
COPD SCREENING SCORE: 0
HAVE YOU SMOKED AT LEAST 100 CIGARETTES IN YOUR ENTIRE LIFE: NO/DON'T KNOW
DURING THE PAST 4 WEEKS HOW MUCH DID YOU FEEL SHORT OF BREATH: NONE/LITTLE OF THE TIME
IN THE PAST 12 MONTHS DO YOU DO LESS THAN YOU USED TO BECAUSE OF YOUR BREATHING PROBLEMS: DISAGREE/UNSURE
DO YOU EVER COUGH UP ANY MUCUS OR PHLEGM?: NO/ONLY WITH OCCASIONAL COLDS OR INFECTIONS

## 2021-02-12 ASSESSMENT — LIFESTYLE VARIABLES
EVER HAD A DRINK FIRST THING IN THE MORNING TO STEADY YOUR NERVES TO GET RID OF A HANGOVER: NO
CONSUMPTION TOTAL: INCOMPLETE
TOTAL SCORE: 0
HAVE YOU EVER FELT YOU SHOULD CUT DOWN ON YOUR DRINKING: NO
HAVE PEOPLE ANNOYED YOU BY CRITICIZING YOUR DRINKING: NO
TOTAL SCORE: 0
ALCOHOL_USE: NO
EVER FELT BAD OR GUILTY ABOUT YOUR DRINKING: NO
TOTAL SCORE: 0
EVER_SMOKED: NEVER

## 2021-02-12 ASSESSMENT — FIBROSIS 4 INDEX: FIB4 SCORE: 1.69

## 2021-02-13 ENCOUNTER — ANESTHESIA (OUTPATIENT)
Dept: ANESTHESIOLOGY | Facility: MEDICAL CENTER | Age: 41
End: 2021-02-13

## 2021-02-13 ENCOUNTER — ANESTHESIA EVENT (OUTPATIENT)
Dept: ANESTHESIOLOGY | Facility: MEDICAL CENTER | Age: 41
End: 2021-02-13

## 2021-02-13 LAB
BASOPHILS # BLD AUTO: 0.1 % (ref 0–1.8)
BASOPHILS # BLD: 0.02 K/UL (ref 0–0.12)
EOSINOPHIL # BLD AUTO: 0.03 K/UL (ref 0–0.51)
EOSINOPHIL NFR BLD: 0.2 % (ref 0–6.9)
ERYTHROCYTE [DISTWIDTH] IN BLOOD BY AUTOMATED COUNT: 42.1 FL (ref 35.9–50)
HCT VFR BLD AUTO: 35 % (ref 37–47)
HGB BLD-MCNC: 11.9 G/DL (ref 12–16)
IMM GRANULOCYTES # BLD AUTO: 0.11 K/UL (ref 0–0.11)
IMM GRANULOCYTES NFR BLD AUTO: 0.7 % (ref 0–0.9)
LYMPHOCYTES # BLD AUTO: 1.37 K/UL (ref 1–4.8)
LYMPHOCYTES NFR BLD: 8.8 % (ref 22–41)
MCH RBC QN AUTO: 31.1 PG (ref 27–33)
MCHC RBC AUTO-ENTMCNC: 34 G/DL (ref 33.6–35)
MCV RBC AUTO: 91.4 FL (ref 81.4–97.8)
MONOCYTES # BLD AUTO: 0.66 K/UL (ref 0–0.85)
MONOCYTES NFR BLD AUTO: 4.2 % (ref 0–13.4)
NEUTROPHILS # BLD AUTO: 13.36 K/UL (ref 2–7.15)
NEUTROPHILS NFR BLD: 86 % (ref 44–72)
NRBC # BLD AUTO: 0 K/UL
NRBC BLD-RTO: 0 /100 WBC
PLATELET # BLD AUTO: 162 K/UL (ref 164–446)
PMV BLD AUTO: 10.2 FL (ref 9–12.9)
RBC # BLD AUTO: 3.83 M/UL (ref 4.2–5.4)
WBC # BLD AUTO: 15.6 K/UL (ref 4.8–10.8)

## 2021-02-13 PROCEDURE — 770002 HCHG ROOM/CARE - OB PRIVATE (112)

## 2021-02-13 PROCEDURE — 36415 COLL VENOUS BLD VENIPUNCTURE: CPT

## 2021-02-13 PROCEDURE — 85025 COMPLETE CBC W/AUTO DIFF WBC: CPT

## 2021-02-13 PROCEDURE — 700101 HCHG RX REV CODE 250

## 2021-02-13 PROCEDURE — 700105 HCHG RX REV CODE 258: Performed by: ANESTHESIOLOGY

## 2021-02-13 PROCEDURE — 700102 HCHG RX REV CODE 250 W/ 637 OVERRIDE(OP): Performed by: OBSTETRICS & GYNECOLOGY

## 2021-02-13 PROCEDURE — 304965 HCHG RECOVERY SERVICES

## 2021-02-13 PROCEDURE — 59409 OBSTETRICAL CARE: CPT

## 2021-02-13 PROCEDURE — 10H07YZ INSERTION OF OTHER DEVICE INTO PRODUCTS OF CONCEPTION, VIA NATURAL OR ARTIFICIAL OPENING: ICD-10-PCS | Performed by: OBSTETRICS & GYNECOLOGY

## 2021-02-13 PROCEDURE — 700111 HCHG RX REV CODE 636 W/ 250 OVERRIDE (IP): Performed by: OBSTETRICS & GYNECOLOGY

## 2021-02-13 PROCEDURE — 700111 HCHG RX REV CODE 636 W/ 250 OVERRIDE (IP): Performed by: ANESTHESIOLOGY

## 2021-02-13 PROCEDURE — 700105 HCHG RX REV CODE 258: Performed by: OBSTETRICS & GYNECOLOGY

## 2021-02-13 PROCEDURE — A9270 NON-COVERED ITEM OR SERVICE: HCPCS | Performed by: OBSTETRICS & GYNECOLOGY

## 2021-02-13 PROCEDURE — 10907ZC DRAINAGE OF AMNIOTIC FLUID, THERAPEUTIC FROM PRODUCTS OF CONCEPTION, VIA NATURAL OR ARTIFICIAL OPENING: ICD-10-PCS | Performed by: OBSTETRICS & GYNECOLOGY

## 2021-02-13 PROCEDURE — 303615 HCHG EPIDURAL/SPINAL ANESTHESIA FOR LABOR

## 2021-02-13 RX ORDER — SODIUM CHLORIDE, SODIUM LACTATE, POTASSIUM CHLORIDE, CALCIUM CHLORIDE 600; 310; 30; 20 MG/100ML; MG/100ML; MG/100ML; MG/100ML
INJECTION, SOLUTION INTRAVENOUS CONTINUOUS
Status: DISCONTINUED | OUTPATIENT
Start: 2021-02-13 | End: 2021-02-14 | Stop reason: HOSPADM

## 2021-02-13 RX ORDER — NIFEDIPINE 30 MG/1
30 TABLET, EXTENDED RELEASE ORAL
Status: DISCONTINUED | OUTPATIENT
Start: 2021-02-14 | End: 2021-02-14 | Stop reason: HOSPADM

## 2021-02-13 RX ORDER — TRANEXAMIC ACID 100 MG/ML
INJECTION, SOLUTION INTRAVENOUS
Status: COMPLETED
Start: 2021-02-13 | End: 2021-02-13

## 2021-02-13 RX ORDER — SODIUM CHLORIDE, SODIUM LACTATE, POTASSIUM CHLORIDE, AND CALCIUM CHLORIDE .6; .31; .03; .02 G/100ML; G/100ML; G/100ML; G/100ML
1000 INJECTION, SOLUTION INTRAVENOUS
Status: COMPLETED | OUTPATIENT
Start: 2021-02-13 | End: 2021-02-13

## 2021-02-13 RX ORDER — ROPIVACAINE HYDROCHLORIDE 2 MG/ML
INJECTION, SOLUTION EPIDURAL; INFILTRATION; PERINEURAL CONTINUOUS
Status: DISCONTINUED | OUTPATIENT
Start: 2021-02-13 | End: 2021-02-14 | Stop reason: HOSPADM

## 2021-02-13 RX ORDER — METHYLERGONOVINE MALEATE 0.2 MG/ML
0.2 INJECTION INTRAVENOUS ONCE
Status: COMPLETED | OUTPATIENT
Start: 2021-02-13 | End: 2021-02-13

## 2021-02-13 RX ORDER — SODIUM CHLORIDE, SODIUM LACTATE, POTASSIUM CHLORIDE, AND CALCIUM CHLORIDE .6; .31; .03; .02 G/100ML; G/100ML; G/100ML; G/100ML
250 INJECTION, SOLUTION INTRAVENOUS PRN
Status: DISCONTINUED | OUTPATIENT
Start: 2021-02-13 | End: 2021-02-13 | Stop reason: HOSPADM

## 2021-02-13 RX ORDER — NIFEDIPINE 30 MG/1
30 TABLET, EXTENDED RELEASE ORAL
Status: DISCONTINUED | OUTPATIENT
Start: 2021-02-13 | End: 2021-02-13

## 2021-02-13 RX ADMIN — TRANEXAMIC ACID 1000 MG: 100 INJECTION, SOLUTION INTRAVENOUS at 18:20

## 2021-02-13 RX ADMIN — MISOPROSTOL 800 MCG: 200 TABLET ORAL at 17:12

## 2021-02-13 RX ADMIN — SODIUM CHLORIDE, POTASSIUM CHLORIDE, SODIUM LACTATE AND CALCIUM CHLORIDE 1000 ML: 600; 310; 30; 20 INJECTION, SOLUTION INTRAVENOUS at 13:47

## 2021-02-13 RX ADMIN — ROPIVACAINE HYDROCHLORIDE: 2 INJECTION, SOLUTION EPIDURAL; INFILTRATION at 13:07

## 2021-02-13 RX ADMIN — OXYTOCIN 125 ML/HR: 10 INJECTION, SOLUTION INTRAMUSCULAR; INTRAVENOUS at 18:20

## 2021-02-13 RX ADMIN — SODIUM CHLORIDE, POTASSIUM CHLORIDE, SODIUM LACTATE AND CALCIUM CHLORIDE: 600; 310; 30; 20 INJECTION, SOLUTION INTRAVENOUS at 05:25

## 2021-02-13 RX ADMIN — SODIUM CHLORIDE, POTASSIUM CHLORIDE, SODIUM LACTATE AND CALCIUM CHLORIDE: 600; 310; 30; 20 INJECTION, SOLUTION INTRAVENOUS at 12:44

## 2021-02-13 RX ADMIN — SODIUM CHLORIDE, POTASSIUM CHLORIDE, SODIUM LACTATE AND CALCIUM CHLORIDE: 600; 310; 30; 20 INJECTION, SOLUTION INTRAVENOUS at 10:49

## 2021-02-13 RX ADMIN — METHYLERGONOVINE MALEATE 0.2 MG: 0.2 INJECTION, SOLUTION INTRAMUSCULAR; INTRAVENOUS at 19:22

## 2021-02-13 RX ADMIN — OXYTOCIN 1 MILLI-UNITS/MIN: 10 INJECTION, SOLUTION INTRAMUSCULAR; INTRAVENOUS at 05:26

## 2021-02-13 RX ADMIN — NIFEDIPINE 30 MG: 30 TABLET, FILM COATED, EXTENDED RELEASE ORAL at 08:00

## 2021-02-13 ASSESSMENT — PATIENT HEALTH QUESTIONNAIRE - PHQ9
SUM OF ALL RESPONSES TO PHQ9 QUESTIONS 1 AND 2: 0
1. LITTLE INTEREST OR PLEASURE IN DOING THINGS: NOT AT ALL
2. FEELING DOWN, DEPRESSED, IRRITABLE, OR HOPELESS: NOT AT ALL

## 2021-02-13 ASSESSMENT — PAIN SCALES - GENERAL: PAIN_LEVEL: 1

## 2021-02-13 NOTE — ANESTHESIA PREPROCEDURE EVALUATION
Relevant Problems   CARDIAC   (+) HTN (hypertension)       Physical Exam    Airway   Mallampati: II  TM distance: >3 FB  Neck ROM: full       Cardiovascular - normal exam  Rhythm: regular  Rate: normal  (-) murmur     Dental - normal exam           Pulmonary - normal exam  Breath sounds clear to auscultation     Abdominal    Neurological - normal exam                 Anesthesia Plan    ASA 2       Plan - epidural               Induction: intravenous    Postoperative Plan: Postoperative administration of opioids is intended.    Pertinent diagnostic labs and testing reviewed    Informed Consent:    Anesthetic plan and risks discussed with patient.    Use of blood products discussed with: patient whom consented to blood products.

## 2021-02-13 NOTE — ANESTHESIA PROCEDURE NOTES
Epidural Block    Date/Time: 2/13/2021 12:50 PM  Performed by: Barney Rowe M.D.  Authorized by: Barney Rowe M.D.     Patient Location:  OB  Start Time:  2/13/2021 12:50 PM  Reason for Block: labor analgesia    patient identified, IV checked, site marked, risks and benefits discussed, surgical consent, monitors and equipment checked, pre-op evaluation and timeout performed    Patient Position:  Sitting  Prep: ChloraPrep, patient draped and sterile technique    Monitoring:  Blood pressure, continuous pulse oximetry and heart rate  Approach:  Midline  Location:  L3-L4  Injection Technique:  SADAF saline  Skin infiltration:  Lidocaine  Strength:  1%  Dose:  3ml  Needle Type:  Tuohy  Needle Gauge:  17 G  Needle Length:  3.5 in  Loss of resistance::  6  Catheter Size:  19 G  Catheter at Skin Depth:  10

## 2021-02-13 NOTE — PROGRESS NOTES
L&D Progress Note    Name:   Megan Nicole   Date/Time:  2/13/2021 11:20 AM  Gestational Age:  39w3d  Admit Date:   2/12/2021  Admitting Dx:   Indication for care in labor or delivery [O75.9]    Subjective:  Got cytotec then started on pitocin as was having variables and some frequent contractions for another cytotec dose. Pt in no distress.  Came to evaluate to see if she'd be a candidate for stout balloon.   Objective:   Vitals:    02/13/21 0759 02/13/21 0910 02/13/21 0939 02/13/21 1039   BP: 118/74 121/72 133/84 120/81   Pulse: 83 75 82 76   Resp:       Temp:       TempSrc:       Weight:       Height:         Gen: no distress  FHT: category 1, moderate variability (had some intermittent variables overnight but none seen now).   tocoq 4-7 min  SVE:   2-3/50/-2  Head well applied.   Ext: no calf pain bilateral LE    Labs:  Recent Results (from the past 72 hour(s))   CBC WITHOUT DIFFERENTIAL    Collection Time: 02/12/21 10:25 PM   Result Value Ref Range    WBC 11.8 (H) 4.8 - 10.8 K/uL    RBC 4.21 4.20 - 5.40 M/uL    Hemoglobin 13.2 12.0 - 16.0 g/dL    Hematocrit 38.1 37.0 - 47.0 %    MCV 90.5 81.4 - 97.8 fL    MCH 31.4 27.0 - 33.0 pg    MCHC 34.6 33.6 - 35.0 g/dL    RDW 40.8 35.9 - 50.0 fL    Platelet Count 200 164 - 446 K/uL    MPV 10.4 9.0 - 12.9 fL   HOLD BLOOD BANK SPECIMEN (NOT TESTED)    Collection Time: 02/12/21 10:25 PM   Result Value Ref Range    Holding Tube - Bb DONE          Assessment: Plan  IOL - s/p cytotec and now on pitocin. Will allow her to get epidural but will then plan for AROM and to be more aggressive with pitocin.  Fetal tracing reassuring  CHTN on procardia - bps stable and no evidence of preeclampsia      Stephanie Joshua M.D.

## 2021-02-13 NOTE — PROGRESS NOTES
0700 Report received, pt care assumed. Pt up bathroom, pt denies pain at this time, denies questions or concerns.

## 2021-02-13 NOTE — PROGRESS NOTES
Labor Progress Note    Megan Bonnie   39w3d      Subjective:  Called to see pt for decel.   Pt got an epidural and comfortable  On 14mu pitocin prior to decels  Low bp - after epidural.    Objective:   Vitals:    02/13/21 1305 02/13/21 1308 02/13/21 1309 02/13/21 1314   BP: 126/82 123/67  114/63   Pulse: 84 81 92 82   Resp:    16   Temp:    36.9 °C (98.5 °F)   TempSrc:    Temporal   SpO2:   96% 96%   Weight:       Height:         SVE: 4/75/-2  Arom clear/IUPC placed initially gave a bloody return but it was removed and replaced.  toco q2-4  FHT category 2, moderate variability, occ late decels and occ variables  Pitocin was turned off -     Labs:  Recent Results (from the past 24 hour(s))   CBC WITHOUT DIFFERENTIAL    Collection Time: 02/12/21 10:25 PM   Result Value Ref Range    WBC 11.8 (H) 4.8 - 10.8 K/uL    RBC 4.21 4.20 - 5.40 M/uL    Hemoglobin 13.2 12.0 - 16.0 g/dL    Hematocrit 38.1 37.0 - 47.0 %    MCV 90.5 81.4 - 97.8 fL    MCH 31.4 27.0 - 33.0 pg    MCHC 34.6 33.6 - 35.0 g/dL    RDW 40.8 35.9 - 50.0 fL    Platelet Count 200 164 - 446 K/uL    MPV 10.4 9.0 - 12.9 fL   HOLD BLOOD BANK SPECIMEN (NOT TESTED)    Collection Time: 02/12/21 10:25 PM   Result Value Ref Range    Holding Tube - Bb DONE        Assessment: plan  39w3d  Will restart pitocin if tracing reassuring  Will make sure bp is up and not reason for decels  If further variables persist will plan amnioinfusion.       Stephanie Joshua M.D.

## 2021-02-13 NOTE — PROGRESS NOTES
2200: pt to labor and delivery, here for scheduled iol for chronic htn. Vss. efm and toco applied. Pt admitted, dr. Orders reviewed. Iv started.     2300: rn at , cytotec placed per md orders. Pt educated.    0400: sve 1/50/-2, pt feeling some uc's.    0445: call to dr. Joshua, report given.  orders received.  Dr. Joshua in the er, will place a balloon when up on the floor. Pitocin started in the mean time.      0700: report to amanda pruitt

## 2021-02-13 NOTE — PROGRESS NOTES
Report received, care assumed. Pt is comfortable, Pit is at 4 milliunits.  0800 pit was increased to 6 and procardia was given.  1039 Dr Joshua in SVE done 3 cm 50% -2. Pt wants epidural before breaking water. Hydrating for epidural.   1255 DR Rowe in, epidural cath was placed.  1300 test dose given, pt tolerated well.  1344 late decels noted, pt on her right side and another bolus with LR started.1350 Dr Joshua was called report given.  1355 Dr Joshua in, SVE done. AROM., clear. IUPC was placed.  1450 report given to Dr Joshua, order received for amnioinfusion.  1500 amnioinfusion started, With room temp LR. Pt to left side.  1514 amnioinfusion off, 300 ml was infused.  1600 pit was restarted at 2 milliunit.  1644 SVE, complete, one trial push done, Dr Joshua was called for delivery.  1655 Pt is pushing with DR Joshua.  1658 female viable baby delivered by Dr Joshua with 8-9 apgars nuchal cord X1 a body cord and a true knot in the cord.  1815 Bleeding is still mod, Dr Joshua was called and report given.order received for TXA to be givenen.  1820 TXA was given over 10 min.  1855 flow is still moderate when rubbed, Dr Joshua was called and order received to give methergen. BPs have been WNL. Report given to Shirley OTERO.

## 2021-02-14 VITALS
HEIGHT: 66 IN | SYSTOLIC BLOOD PRESSURE: 119 MMHG | DIASTOLIC BLOOD PRESSURE: 80 MMHG | WEIGHT: 195 LBS | OXYGEN SATURATION: 93 % | RESPIRATION RATE: 18 BRPM | HEART RATE: 100 BPM | BODY MASS INDEX: 31.34 KG/M2 | TEMPERATURE: 98.2 F

## 2021-02-14 LAB
ERYTHROCYTE [DISTWIDTH] IN BLOOD BY AUTOMATED COUNT: 43.1 FL (ref 35.9–50)
HCT VFR BLD AUTO: 32.7 % (ref 37–47)
HGB BLD-MCNC: 11.1 G/DL (ref 12–16)
MCH RBC QN AUTO: 31.8 PG (ref 27–33)
MCHC RBC AUTO-ENTMCNC: 33.9 G/DL (ref 33.6–35)
MCV RBC AUTO: 93.7 FL (ref 81.4–97.8)
PLATELET # BLD AUTO: 153 K/UL (ref 164–446)
PMV BLD AUTO: 10.3 FL (ref 9–12.9)
RBC # BLD AUTO: 3.49 M/UL (ref 4.2–5.4)
WBC # BLD AUTO: 11 K/UL (ref 4.8–10.8)

## 2021-02-14 PROCEDURE — 700102 HCHG RX REV CODE 250 W/ 637 OVERRIDE(OP): Performed by: OBSTETRICS & GYNECOLOGY

## 2021-02-14 PROCEDURE — 85027 COMPLETE CBC AUTOMATED: CPT

## 2021-02-14 PROCEDURE — A9270 NON-COVERED ITEM OR SERVICE: HCPCS | Performed by: OBSTETRICS & GYNECOLOGY

## 2021-02-14 PROCEDURE — 36415 COLL VENOUS BLD VENIPUNCTURE: CPT

## 2021-02-14 RX ORDER — SODIUM CHLORIDE, SODIUM LACTATE, POTASSIUM CHLORIDE, CALCIUM CHLORIDE 600; 310; 30; 20 MG/100ML; MG/100ML; MG/100ML; MG/100ML
INJECTION, SOLUTION INTRAVENOUS PRN
Status: DISCONTINUED | OUTPATIENT
Start: 2021-02-14 | End: 2021-02-14 | Stop reason: HOSPADM

## 2021-02-14 RX ORDER — IBUPROFEN 600 MG/1
600 TABLET ORAL EVERY 6 HOURS PRN
Qty: 30 TABLET | Refills: 0 | Status: SHIPPED | OUTPATIENT
Start: 2021-02-14 | End: 2023-08-16

## 2021-02-14 RX ORDER — OXYCODONE HYDROCHLORIDE AND ACETAMINOPHEN 5; 325 MG/1; MG/1
1 TABLET ORAL EVERY 4 HOURS PRN
Qty: 15 TABLET | Refills: 0 | Status: SHIPPED | OUTPATIENT
Start: 2021-02-14 | End: 2021-02-21

## 2021-02-14 RX ORDER — DOCUSATE SODIUM 100 MG/1
100 CAPSULE, LIQUID FILLED ORAL 2 TIMES DAILY PRN
Status: DISCONTINUED | OUTPATIENT
Start: 2021-02-14 | End: 2021-02-14 | Stop reason: HOSPADM

## 2021-02-14 RX ORDER — MISOPROSTOL 200 UG/1
600 TABLET ORAL
Status: DISCONTINUED | OUTPATIENT
Start: 2021-02-14 | End: 2021-02-14 | Stop reason: HOSPADM

## 2021-02-14 RX ORDER — VITAMIN A ACETATE, BETA CAROTENE, ASCORBIC ACID, CHOLECALCIFEROL, .ALPHA.-TOCOPHEROL ACETATE, DL-, THIAMINE MONONITRATE, RIBOFLAVIN, NIACINAMIDE, PYRIDOXINE HYDROCHLORIDE, FOLIC ACID, CYANOCOBALAMIN, CALCIUM CARBONATE, FERROUS FUMARATE, ZINC OXIDE, CUPRIC OXIDE 3080; 12; 120; 400; 1; 1.84; 3; 20; 22; 920; 25; 200; 27; 10; 2 [IU]/1; UG/1; MG/1; [IU]/1; MG/1; MG/1; MG/1; MG/1; MG/1; [IU]/1; MG/1; MG/1; MG/1; MG/1; MG/1
1 TABLET, FILM COATED ORAL
Status: DISCONTINUED | OUTPATIENT
Start: 2021-02-14 | End: 2021-02-14 | Stop reason: HOSPADM

## 2021-02-14 RX ADMIN — NIFEDIPINE 30 MG: 30 TABLET, EXTENDED RELEASE ORAL at 05:58

## 2021-02-14 RX ADMIN — PRENATAL WITH FERROUS FUM AND FOLIC ACID 1 TABLET: 3080; 920; 120; 400; 22; 1.84; 3; 20; 10; 1; 12; 200; 27; 25; 2 TABLET ORAL at 09:07

## 2021-02-14 RX ADMIN — IBUPROFEN 600 MG: 600 TABLET, FILM COATED ORAL at 14:08

## 2021-02-14 RX ADMIN — OXYCODONE HYDROCHLORIDE AND ACETAMINOPHEN 2 TABLET: 5; 325 TABLET ORAL at 06:24

## 2021-02-14 RX ADMIN — OXYCODONE HYDROCHLORIDE AND ACETAMINOPHEN 1 TABLET: 5; 325 TABLET ORAL at 14:08

## 2021-02-14 ASSESSMENT — EDINBURGH POSTNATAL DEPRESSION SCALE (EPDS)
I HAVE BLAMED MYSELF UNNECESSARILY WHEN THINGS WENT WRONG: NO, NEVER
I HAVE FELT SCARED OR PANICKY FOR NO GOOD REASON: NO, NOT AT ALL
I HAVE BEEN SO UNHAPPY THAT I HAVE HAD DIFFICULTY SLEEPING: NOT AT ALL
I HAVE FELT SAD OR MISERABLE: NO, NOT AT ALL
I HAVE BEEN SO UNHAPPY THAT I HAVE BEEN CRYING: NO, NEVER
I HAVE LOOKED FORWARD WITH ENJOYMENT TO THINGS: AS MUCH AS I EVER DID
I HAVE BEEN ABLE TO LAUGH AND SEE THE FUNNY SIDE OF THINGS: AS MUCH AS I ALWAYS COULD
I HAVE BEEN ANXIOUS OR WORRIED FOR NO GOOD REASON: NO, NOT AT ALL
THINGS HAVE BEEN GETTING ON TOP OF ME: NO, I HAVE BEEN COPING AS WELL AS EVER
THE THOUGHT OF HARMING MYSELF HAS OCCURRED TO ME: NEVER

## 2021-02-14 NOTE — PROGRESS NOTES
Progress Note    Megan Nicole   PP day 1  Chief Admitting Dx: Indication for care in labor or delivery [O75.9]  Delivery Type: vaginal, spontaneous      Subjective:  Ambulating: voiding, kinjal diet, normal lochia. Bleeding improved  Did get cytotec/methergine and txa.   H/h for this am still pending  Vitals:    02/13/21 2100 02/13/21 2200 02/14/21 0200 02/14/21 0600   BP: 128/79 122/86 121/73 103/74   Pulse: 88 94 86 74   Resp:  18 18 18   Temp:  36.9 °C (98.4 °F) 36.8 °C (98.2 °F) 36.1 °C (97 °F)   TempSrc:  Temporal Temporal Temporal   SpO2:  97% 95% 98%   Weight:       Height:           Exam:  Gen: no acute distress  Abdomen: soft nt/nd firm fundus  Ext: no calf pain barbara LE    Labs:   Recent Results (from the past 24 hour(s))   CBC WITH DIFFERENTIAL    Collection Time: 02/13/21  7:43 PM   Result Value Ref Range    WBC 15.6 (H) 4.8 - 10.8 K/uL    RBC 3.83 (L) 4.20 - 5.40 M/uL    Hemoglobin 11.9 (L) 12.0 - 16.0 g/dL    Hematocrit 35.0 (L) 37.0 - 47.0 %    MCV 91.4 81.4 - 97.8 fL    MCH 31.1 27.0 - 33.0 pg    MCHC 34.0 33.6 - 35.0 g/dL    RDW 42.1 35.9 - 50.0 fL    Platelet Count 162 (L) 164 - 446 K/uL    MPV 10.2 9.0 - 12.9 fL    Neutrophils-Polys 86.00 (H) 44.00 - 72.00 %    Lymphocytes 8.80 (L) 22.00 - 41.00 %    Monocytes 4.20 0.00 - 13.40 %    Eosinophils 0.20 0.00 - 6.90 %    Basophils 0.10 0.00 - 1.80 %    Immature Granulocytes 0.70 0.00 - 0.90 %    Nucleated RBC 0.00 /100 WBC    Neutrophils (Absolute) 13.36 (H) 2.00 - 7.15 K/uL    Lymphs (Absolute) 1.37 1.00 - 4.80 K/uL    Monos (Absolute) 0.66 0.00 - 0.85 K/uL    Eos (Absolute) 0.03 0.00 - 0.51 K/uL    Baso (Absolute) 0.02 0.00 - 0.12 K/uL    Immature Granulocytes (abs) 0.11 0.00 - 0.11 K/uL    NRBC (Absolute) 0.00 K/uL       Assessment:  Ppd 1  Plan:  Dc home later today if h/h stable and baby discharged.   Discharge home  Encourage ambulation  Pelvic rest, no heavy lifting/pulling /pushing  F/u in my office  6 weeks postpartum  Continue prenatal vitamins  daily  Give Rhogam if Rh negative and indicated  Give MMR if indcated prior to discharge  Encourage breastfeeding    Stephanie Joshua M.D.

## 2021-02-14 NOTE — PROGRESS NOTES
Female, vtx, clear fluid  apgars 8/9  Weight pending  Intact placenta, 3vc  No lacs/tears  Body cordx 1, nuchal cord x 1, true knot x 1  PP uterine atony: 800mcg cytotec given  Ebl 700cc      See dictation.

## 2021-02-14 NOTE — ANESTHESIA POSTPROCEDURE EVALUATION
Patient: Megan Nciole    Procedure Summary     Date: 02/13/21 Room / Location:     Anesthesia Start: 1250 Anesthesia Stop: 1658    Procedure: Labor Epidural Diagnosis:     Scheduled Providers:  Responsible Provider: Barney Rowe M.D.    Anesthesia Type: epidural ASA Status: 2          Final Anesthesia Type: epidural  Last vitals  BP   Blood Pressure: 128/79    Temp   36.9 °C (98.5 °F)    Pulse   88   Resp   16    SpO2   97 %      Anesthesia Post Evaluation    Patient location during evaluation: PACU  Patient participation: complete - patient participated  Level of consciousness: awake and alert  Pain score: 1    Airway patency: patent  Anesthetic complications: no  Cardiovascular status: hemodynamically stable  Respiratory status: acceptable  Hydration status: euvolemic    PONV: none          No complications documented.     Nurse Pain Score: 1 (NPRS)

## 2021-02-14 NOTE — DISCHARGE SUMMARY
DATE OF ADMISSION:  02/12/2021   DATE OF DISCHARGE:  02/14/2021     PRINCIPAL DIAGNOSES:  1.  Intrauterine pregnancy at 39 weeks' gestation.  2.  Chronic hypertension.  3.  Postpartum uterine atony.     PRINCIPAL PROCEDURES:   1.  Induction of labor.  2.  Epidural anesthesia.  3.  Normal spontaneous vaginal delivery.     HOSPITAL COURSE:  The patient arrived for scheduled induction of labor at 39   weeks gestation for chronic hypertension, on Procardia, stable.  She had no   evidence of preeclampsia.  She had Cytotec placed followed by augmentation   with Pitocin.  She received epidural anesthesia.  She had artificial rupture   of membranes and then again was augmented until she underwent a vaginal   delivery of a female infant on the 13th.  Apgars were 8 and 9 and weight was   2560 grams, so 5 pounds 10.3 ounces.  She did have postpartum uterine atony   and a lost approximately 700 mL of blood with delivery.  She subsequently lost   about 300 more mL and received uterine massage, Cytotec, Methergine as well   as took some tranexamic acid to contract the uterus and not have bleed so   much.  Her post-delivery hemoglobin was stable and the one for this morning is   pending.  We will plan to discharge her home, but will have Dr. Donato   called with the results prior to that as she is on call today.  Again, she   will follow up in 6 weeks, sooner if needed.  She will resume her Procardia   and prenatal vitamins.        ______________________________  MD WESTLEY DIAZ/ROSETTE/MADDY    DD:  02/14/2021 08:00  DT:  02/14/2021 09:05    Job#:  [id number]

## 2021-02-14 NOTE — CARE PLAN
Problem: Communication  Goal: The ability to communicate needs accurately and effectively will improve  Outcome: PROGRESSING AS EXPECTED  Patient's POC discussed and questions answered. Patient asking and answering questions appropriately.       Problem: Safety  Goal: Will remain free from injury  Outcome: PROGRESSING AS EXPECTED  Right medication and patient armband scanned prior to administration. Patient's family instructed to notify RN of any spills, wires or anything unsafe in room.  Fundus being continually checked for bleeding. RN assessing frequently.

## 2021-02-14 NOTE — PROGRESS NOTES
1900: Assumed care of patient, Report Received from MARLENI Dumont RN. Mercedes, Gestation 39.3.     Per. MARLENI Dumont, patient has rec'd cytotec, TXA and per MARLENI Dumont, Dr. Joshua would like methergine given. RN at  for administration - see MAR. Fundus now scant; isaias pad changed.     2110: Patient ambulated, voided, isaias care completed. Patient tolerated well. Patient and infant transferred to  via wheelchair. Report given to CHANO Terry RN. Patient in stable condition with firm fundus, and light lochia. ID bands verified.

## 2021-02-14 NOTE — ANESTHESIA TIME REPORT
Anesthesia Start and Stop Event Times     Date Time Event    2/13/2021 1250 Ready for Procedure     1250 Anesthesia Start     1658 Anesthesia Stop        Responsible Staff  02/13/21    Name Role Begin End    Barney Rowe M.D. Anesth 1250 1651        Preop Diagnosis (Free Text):  Pre-op Diagnosis             Preop Diagnosis (Codes):    Post op Diagnosis  Pregnancy      Premium Reason  E. Weekend    Comments:

## 2021-02-14 NOTE — LACTATION NOTE
This note was copied from a baby's chart.  Mother reports that she is breastfeeding her  without difficulty or discomfort. Resources for out-patient support discussed.    Observed baby latching and talked with parents, answered general questions regarding lactation, returning to work, supply and demand.

## 2021-02-14 NOTE — L&D DELIVERY NOTE
DATE OF SERVICE:  2021     PREOPERATIVE DIAGNOSES:  1.  Intrauterine pregnancy at 39+ weeks' gestation.  2.  Chronic hypertension, well-controlled.     POSTOPERATIVE DIAGNOSES:  1.  Intrauterine pregnancy at 39+ weeks' gestation.  2.  Chronic hypertension, well-controlled.  3.  Postpartum uterine atony.     PROCEDURE:  Normal spontaneous vaginal delivery.     DELIVERING PHYSICIAN:  Stephanie Joshua MD     ATTENDING PHYSICIAN:  Milly Dash MD     ANESTHESIOLOGIST:  Barney Rowe MD     ANESTHESIA:  Epidural.     COMPLICATIONS:  Postpartum uterine atony, 800 mcg of Cytotec was placed   rectally.     ESTIMATED BLOOD LOSS:  700 mL.     FINDINGS:  Female infant, cephalic presentation, clear amniotic fluid, Apgars   8 and 9, weight still pending at the time of this dictation.  Intact placenta,   3-vessel cord.  Nuchal cord x1, body cord x1 and true knot in the umbilical   cord x1.  No lacerations or excoriations.  Sponge, lap, and needle counts were   correct x3.  Mother and baby both doing well.  Mother will go to postpartum,   baby to  nursery.     HOSPITAL COURSE:  The patient arrived for scheduled induction of labor for   chronic hypertension at 39 weeks' gestation.  She is well controlled on   Procardia.  She had several doses of Cytotec placed followed by IV Pitocin.    She was augmented with Pitocin and when she was 3 cm.  She then received epidural   anesthesia.  The baby had some multiple variable decelerations and a few late   decelerations when her blood pressure was low.  At that time, artificial   rupture of membranes was performed and she was internalized.  The Pitocin was   actually turned off at that time and she only required a minimal amount of   Pitocin thereafter to keep her contractions strong.  She eventually went to   complete.  Once complete, she had some deep variables and decelerations.  She   pushed and through only three contractions delivered a vigorous female infant    over an intact perineum.  Once the anterior shoulder delivered, the rest of   the baby delivered uneventfully.  There was a body cord x1, nuchal cord x1,   and a true knot x1.  Once the baby was untangled and the cords reduced, the   baby was placed on maternal abdomen with awaiting delivery team nurse.    Respiratory therapy was standing by but not needed in the end.  Cord was   clamped x2 and cut by father of the baby.  The placenta delivered   spontaneously intact, 3-vessel cord.  She had no perineal or vaginal   lacerations or tears.  She did have some uterine atony.  Uterine massage was   performed and 800 mcg of Cytotec was placed rectally.  Sponge, laps, and   needle counts were correct x3.  Again, mother and baby both doing well.    Mother will go to postpartum, baby to  nursery.        ______________________________  MD WESTLEY DIAZ/SILVIA/TONE    DD:  2021 17:45  DT:  2021 18:18    Job#:  [id number]

## 2021-02-15 NOTE — PROGRESS NOTES
D/c instructions given to pt regarding medications and follow up appt. Educated PP d/c instructions, pt understands and questions answered. vss.

## 2021-02-28 NOTE — DOCUMENTATION QUERY
[facility]                                                                       Query Response Note      PATIENT:               LINSEY PICKERING  ACCT #:                  [id number]  MRN:                     [id number]  :                      1980  ADMIT DATE:       2021 9:50 PM  DISCH DATE:          RESPONDING  PROVIDER #:        [id number]           QUERY TEXT:    Please clarify the underlying cause of the low blood pressure:    NOTE:  If an appropriate response is not listed below, please respond with a new note.    Amanda [email or web address]    The patient's clinical indicators include:  Low bp after epidural is documented in Dr. Rosen's PN on .  Options provided:   -- Low blood pressure reading only   -- low blood pressure due to epidural   -- low blood pressure is a complication of labor   -- low blood pressure is not clinically significant   -- unable to determine      Query created by: Amanda Duff on 2021 11:49 AM    RESPONSE TEXT:    low blood pressure due to epidural          Electronically signed by:  ALEXANDER ROSEN MD 2021 10:01 AM

## 2023-08-16 ENCOUNTER — APPOINTMENT (OUTPATIENT)
Dept: RADIOLOGY | Facility: MEDICAL CENTER | Age: 43
DRG: 371 | End: 2023-08-16
Attending: STUDENT IN AN ORGANIZED HEALTH CARE EDUCATION/TRAINING PROGRAM
Payer: COMMERCIAL

## 2023-08-16 ENCOUNTER — HOSPITAL ENCOUNTER (INPATIENT)
Facility: MEDICAL CENTER | Age: 43
LOS: 4 days | DRG: 371 | End: 2023-08-20
Attending: STUDENT IN AN ORGANIZED HEALTH CARE EDUCATION/TRAINING PROGRAM | Admitting: INTERNAL MEDICINE
Payer: COMMERCIAL

## 2023-08-16 DIAGNOSIS — K85.20 ALCOHOL-INDUCED ACUTE PANCREATITIS, UNSPECIFIED COMPLICATION STATUS: ICD-10-CM

## 2023-08-16 DIAGNOSIS — E83.42 HYPOMAGNESEMIA: ICD-10-CM

## 2023-08-16 DIAGNOSIS — K86.3 PANCREATIC PSEUDOCYST: ICD-10-CM

## 2023-08-16 DIAGNOSIS — K86.89 PANCREATIC INSUFFICIENCY: ICD-10-CM

## 2023-08-16 DIAGNOSIS — R10.9 INTRACTABLE ABDOMINAL PAIN: ICD-10-CM

## 2023-08-16 DIAGNOSIS — R11.2 NAUSEA AND VOMITING, UNSPECIFIED VOMITING TYPE: ICD-10-CM

## 2023-08-16 PROBLEM — K85.90 RECURRENT ACUTE PANCREATITIS: Status: ACTIVE | Noted: 2023-08-16

## 2023-08-16 LAB
ALBUMIN SERPL BCP-MCNC: 3.8 G/DL (ref 3.2–4.9)
ALBUMIN/GLOB SERPL: 1 G/DL
ALP SERPL-CCNC: 101 U/L (ref 30–99)
ALT SERPL-CCNC: 9 U/L (ref 2–50)
ANION GAP SERPL CALC-SCNC: 16 MMOL/L (ref 7–16)
APPEARANCE UR: ABNORMAL
AST SERPL-CCNC: 20 U/L (ref 12–45)
BACTERIA #/AREA URNS HPF: ABNORMAL /HPF
BASOPHILS # BLD AUTO: 0.1 % (ref 0–1.8)
BASOPHILS # BLD: 0.01 K/UL (ref 0–0.12)
BILIRUB SERPL-MCNC: 0.4 MG/DL (ref 0.1–1.5)
BILIRUB UR QL STRIP.AUTO: ABNORMAL
BUN SERPL-MCNC: 14 MG/DL (ref 8–22)
CALCIUM ALBUM COR SERPL-MCNC: 9.6 MG/DL (ref 8.5–10.5)
CALCIUM SERPL-MCNC: 9.4 MG/DL (ref 8.4–10.2)
CHLORIDE SERPL-SCNC: 99 MMOL/L (ref 96–112)
CO2 SERPL-SCNC: 22 MMOL/L (ref 20–33)
COLOR UR: YELLOW
CREAT SERPL-MCNC: 0.53 MG/DL (ref 0.5–1.4)
EOSINOPHIL # BLD AUTO: 0.03 K/UL (ref 0–0.51)
EOSINOPHIL NFR BLD: 0.4 % (ref 0–6.9)
EPI CELLS #/AREA URNS HPF: ABNORMAL /HPF
ERYTHROCYTE [DISTWIDTH] IN BLOOD BY AUTOMATED COUNT: 41.6 FL (ref 35.9–50)
GFR SERPLBLD CREATININE-BSD FMLA CKD-EPI: 119 ML/MIN/1.73 M 2
GLOBULIN SER CALC-MCNC: 4 G/DL (ref 1.9–3.5)
GLUCOSE SERPL-MCNC: 94 MG/DL (ref 65–99)
GLUCOSE UR STRIP.AUTO-MCNC: NEGATIVE MG/DL
HCG SERPL QL: NEGATIVE
HCT VFR BLD AUTO: 36.4 % (ref 37–47)
HGB BLD-MCNC: 12 G/DL (ref 12–16)
IMM GRANULOCYTES # BLD AUTO: 0.03 K/UL (ref 0–0.11)
IMM GRANULOCYTES NFR BLD AUTO: 0.4 % (ref 0–0.9)
KETONES UR STRIP.AUTO-MCNC: >=80 MG/DL
LEUKOCYTE ESTERASE UR QL STRIP.AUTO: NEGATIVE
LIPASE SERPL-CCNC: 209 U/L (ref 11–82)
LYMPHOCYTES # BLD AUTO: 1.11 K/UL (ref 1–4.8)
LYMPHOCYTES NFR BLD: 13.1 % (ref 22–41)
MCH RBC QN AUTO: 31.1 PG (ref 27–33)
MCHC RBC AUTO-ENTMCNC: 33 G/DL (ref 32.2–35.5)
MCV RBC AUTO: 94.3 FL (ref 81.4–97.8)
MICRO URNS: ABNORMAL
MONOCYTES # BLD AUTO: 0.32 K/UL (ref 0–0.85)
MONOCYTES NFR BLD AUTO: 3.8 % (ref 0–13.4)
NEUTROPHILS # BLD AUTO: 6.95 K/UL (ref 1.82–7.42)
NEUTROPHILS NFR BLD: 82.2 % (ref 44–72)
NITRITE UR QL STRIP.AUTO: NEGATIVE
NRBC # BLD AUTO: 0 K/UL
NRBC BLD-RTO: 0 /100 WBC (ref 0–0.2)
PH UR STRIP.AUTO: 6.5 [PH] (ref 5–8)
PLATELET # BLD AUTO: 457 K/UL (ref 164–446)
PMV BLD AUTO: 8.2 FL (ref 9–12.9)
POTASSIUM SERPL-SCNC: 3.3 MMOL/L (ref 3.6–5.5)
PROT SERPL-MCNC: 7.8 G/DL (ref 6–8.2)
PROT UR QL STRIP: 30 MG/DL
RBC # BLD AUTO: 3.86 M/UL (ref 4.2–5.4)
RBC # URNS HPF: ABNORMAL /HPF
RBC UR QL AUTO: NEGATIVE
SODIUM SERPL-SCNC: 137 MMOL/L (ref 135–145)
SP GR UR STRIP.AUTO: >=1.03
WBC # BLD AUTO: 8.5 K/UL (ref 4.8–10.8)
WBC #/AREA URNS HPF: ABNORMAL /HPF

## 2023-08-16 PROCEDURE — 700105 HCHG RX REV CODE 258: Performed by: STUDENT IN AN ORGANIZED HEALTH CARE EDUCATION/TRAINING PROGRAM

## 2023-08-16 PROCEDURE — 770001 HCHG ROOM/CARE - MED/SURG/GYN PRIV*

## 2023-08-16 PROCEDURE — 96365 THER/PROPH/DIAG IV INF INIT: CPT

## 2023-08-16 PROCEDURE — 85025 COMPLETE CBC W/AUTO DIFF WBC: CPT

## 2023-08-16 PROCEDURE — 700117 HCHG RX CONTRAST REV CODE 255: Performed by: STUDENT IN AN ORGANIZED HEALTH CARE EDUCATION/TRAINING PROGRAM

## 2023-08-16 PROCEDURE — 74177 CT ABD & PELVIS W/CONTRAST: CPT

## 2023-08-16 PROCEDURE — 36415 COLL VENOUS BLD VENIPUNCTURE: CPT

## 2023-08-16 PROCEDURE — 96376 TX/PRO/DX INJ SAME DRUG ADON: CPT

## 2023-08-16 PROCEDURE — 99285 EMERGENCY DEPT VISIT HI MDM: CPT

## 2023-08-16 PROCEDURE — 99223 1ST HOSP IP/OBS HIGH 75: CPT | Performed by: INTERNAL MEDICINE

## 2023-08-16 PROCEDURE — 700111 HCHG RX REV CODE 636 W/ 250 OVERRIDE (IP): Mod: JZ | Performed by: STUDENT IN AN ORGANIZED HEALTH CARE EDUCATION/TRAINING PROGRAM

## 2023-08-16 PROCEDURE — 87040 BLOOD CULTURE FOR BACTERIA: CPT | Mod: 91

## 2023-08-16 PROCEDURE — 81001 URINALYSIS AUTO W/SCOPE: CPT

## 2023-08-16 PROCEDURE — 84703 CHORIONIC GONADOTROPIN ASSAY: CPT

## 2023-08-16 PROCEDURE — 96375 TX/PRO/DX INJ NEW DRUG ADDON: CPT

## 2023-08-16 PROCEDURE — 83690 ASSAY OF LIPASE: CPT

## 2023-08-16 PROCEDURE — 80053 COMPREHEN METABOLIC PANEL: CPT

## 2023-08-16 PROCEDURE — 94760 N-INVAS EAR/PLS OXIMETRY 1: CPT

## 2023-08-16 RX ORDER — HYDROMORPHONE HYDROCHLORIDE 1 MG/ML
0.5 INJECTION, SOLUTION INTRAMUSCULAR; INTRAVENOUS; SUBCUTANEOUS ONCE
Status: COMPLETED | OUTPATIENT
Start: 2023-08-16 | End: 2023-08-16

## 2023-08-16 RX ORDER — OXYCODONE HYDROCHLORIDE 5 MG/1
5 TABLET ORAL
Status: DISCONTINUED | OUTPATIENT
Start: 2023-08-16 | End: 2023-08-18

## 2023-08-16 RX ORDER — SODIUM CHLORIDE 9 MG/ML
INJECTION, SOLUTION INTRAVENOUS CONTINUOUS
Status: DISCONTINUED | OUTPATIENT
Start: 2023-08-16 | End: 2023-08-20 | Stop reason: HOSPADM

## 2023-08-16 RX ORDER — ONDANSETRON 2 MG/ML
4 INJECTION INTRAMUSCULAR; INTRAVENOUS ONCE
Status: COMPLETED | OUTPATIENT
Start: 2023-08-16 | End: 2023-08-16

## 2023-08-16 RX ORDER — NIFEDIPINE 30 MG/1
30 TABLET, EXTENDED RELEASE ORAL DAILY
Status: DISCONTINUED | OUTPATIENT
Start: 2023-08-17 | End: 2023-08-20 | Stop reason: HOSPADM

## 2023-08-16 RX ORDER — ONDANSETRON 2 MG/ML
4 INJECTION INTRAMUSCULAR; INTRAVENOUS EVERY 4 HOURS PRN
Status: DISCONTINUED | OUTPATIENT
Start: 2023-08-16 | End: 2023-08-20 | Stop reason: HOSPADM

## 2023-08-16 RX ORDER — MORPHINE SULFATE 4 MG/ML
2 INJECTION INTRAVENOUS ONCE
Status: DISCONTINUED | OUTPATIENT
Start: 2023-08-16 | End: 2023-08-16

## 2023-08-16 RX ORDER — DIPHENHYDRAMINE HYDROCHLORIDE 50 MG/ML
25 INJECTION INTRAMUSCULAR; INTRAVENOUS EVERY 6 HOURS PRN
Status: DISCONTINUED | OUTPATIENT
Start: 2023-08-16 | End: 2023-08-20 | Stop reason: HOSPADM

## 2023-08-16 RX ORDER — METRONIDAZOLE 500 MG/100ML
500 INJECTION, SOLUTION INTRAVENOUS ONCE
Status: COMPLETED | OUTPATIENT
Start: 2023-08-16 | End: 2023-08-17

## 2023-08-16 RX ORDER — SODIUM CHLORIDE 9 MG/ML
1000 INJECTION, SOLUTION INTRAVENOUS ONCE
Status: COMPLETED | OUTPATIENT
Start: 2023-08-16 | End: 2023-08-16

## 2023-08-16 RX ORDER — ONDANSETRON 4 MG/1
4 TABLET, ORALLY DISINTEGRATING ORAL EVERY 4 HOURS PRN
Status: DISCONTINUED | OUTPATIENT
Start: 2023-08-16 | End: 2023-08-20 | Stop reason: HOSPADM

## 2023-08-16 RX ORDER — PROCHLORPERAZINE EDISYLATE 5 MG/ML
5-10 INJECTION INTRAMUSCULAR; INTRAVENOUS EVERY 4 HOURS PRN
Status: DISCONTINUED | OUTPATIENT
Start: 2023-08-16 | End: 2023-08-20 | Stop reason: HOSPADM

## 2023-08-16 RX ORDER — DIPHENHYDRAMINE HYDROCHLORIDE 50 MG/ML
25 INJECTION INTRAMUSCULAR; INTRAVENOUS ONCE
Status: COMPLETED | OUTPATIENT
Start: 2023-08-16 | End: 2023-08-16

## 2023-08-16 RX ORDER — METRONIDAZOLE 500 MG/100ML
500 INJECTION, SOLUTION INTRAVENOUS EVERY 12 HOURS
Status: DISCONTINUED | OUTPATIENT
Start: 2023-08-17 | End: 2023-08-20 | Stop reason: HOSPADM

## 2023-08-16 RX ORDER — MORPHINE SULFATE 4 MG/ML
4 INJECTION INTRAVENOUS
Status: DISCONTINUED | OUTPATIENT
Start: 2023-08-16 | End: 2023-08-17

## 2023-08-16 RX ORDER — PROMETHAZINE HYDROCHLORIDE 25 MG/1
12.5-25 SUPPOSITORY RECTAL EVERY 4 HOURS PRN
Status: DISCONTINUED | OUTPATIENT
Start: 2023-08-16 | End: 2023-08-20 | Stop reason: HOSPADM

## 2023-08-16 RX ORDER — HYDROMORPHONE HYDROCHLORIDE 1 MG/ML
1 INJECTION, SOLUTION INTRAMUSCULAR; INTRAVENOUS; SUBCUTANEOUS ONCE
Status: COMPLETED | OUTPATIENT
Start: 2023-08-16 | End: 2023-08-16

## 2023-08-16 RX ORDER — POLYETHYLENE GLYCOL 3350 17 G/17G
1 POWDER, FOR SOLUTION ORAL
Status: DISCONTINUED | OUTPATIENT
Start: 2023-08-16 | End: 2023-08-20 | Stop reason: HOSPADM

## 2023-08-16 RX ORDER — LABETALOL HYDROCHLORIDE 5 MG/ML
10 INJECTION, SOLUTION INTRAVENOUS EVERY 4 HOURS PRN
Status: DISCONTINUED | OUTPATIENT
Start: 2023-08-16 | End: 2023-08-20 | Stop reason: HOSPADM

## 2023-08-16 RX ORDER — OXYCODONE HYDROCHLORIDE 10 MG/1
10 TABLET ORAL
Status: DISCONTINUED | OUTPATIENT
Start: 2023-08-16 | End: 2023-08-18

## 2023-08-16 RX ORDER — AMOXICILLIN 250 MG
2 CAPSULE ORAL 2 TIMES DAILY
Status: DISCONTINUED | OUTPATIENT
Start: 2023-08-16 | End: 2023-08-20 | Stop reason: HOSPADM

## 2023-08-16 RX ORDER — PROMETHAZINE HYDROCHLORIDE 25 MG/1
12.5-25 TABLET ORAL EVERY 4 HOURS PRN
Status: DISCONTINUED | OUTPATIENT
Start: 2023-08-16 | End: 2023-08-20 | Stop reason: HOSPADM

## 2023-08-16 RX ORDER — POTASSIUM CHLORIDE 20 MEQ/1
40 TABLET, EXTENDED RELEASE ORAL 2 TIMES DAILY
Status: COMPLETED | OUTPATIENT
Start: 2023-08-16 | End: 2023-08-17

## 2023-08-16 RX ORDER — BISACODYL 10 MG
10 SUPPOSITORY, RECTAL RECTAL
Status: DISCONTINUED | OUTPATIENT
Start: 2023-08-16 | End: 2023-08-20 | Stop reason: HOSPADM

## 2023-08-16 RX ADMIN — HYDROMORPHONE HYDROCHLORIDE 1 MG: 1 INJECTION, SOLUTION INTRAMUSCULAR; INTRAVENOUS; SUBCUTANEOUS at 21:30

## 2023-08-16 RX ADMIN — IOHEXOL 100 ML: 350 INJECTION, SOLUTION INTRAVENOUS at 20:48

## 2023-08-16 RX ADMIN — DIPHENHYDRAMINE HYDROCHLORIDE 25 MG: 50 INJECTION, SOLUTION INTRAMUSCULAR; INTRAVENOUS at 21:58

## 2023-08-16 RX ADMIN — ONDANSETRON 4 MG: 2 INJECTION INTRAMUSCULAR; INTRAVENOUS at 20:47

## 2023-08-16 RX ADMIN — HYDROMORPHONE HYDROCHLORIDE 0.5 MG: 1 INJECTION, SOLUTION INTRAMUSCULAR; INTRAVENOUS; SUBCUTANEOUS at 20:49

## 2023-08-16 RX ADMIN — CEFTRIAXONE SODIUM 1000 MG: 1 INJECTION, POWDER, FOR SOLUTION INTRAMUSCULAR; INTRAVENOUS at 22:17

## 2023-08-16 RX ADMIN — METRONIDAZOLE 500 MG: 500 SOLUTION INTRAVENOUS at 23:01

## 2023-08-16 RX ADMIN — SODIUM CHLORIDE 1000 ML: 9 INJECTION, SOLUTION INTRAVENOUS at 20:43

## 2023-08-16 ASSESSMENT — COGNITIVE AND FUNCTIONAL STATUS - GENERAL
DAILY ACTIVITIY SCORE: 24
SUGGESTED CMS G CODE MODIFIER DAILY ACTIVITY: CH
SUGGESTED CMS G CODE MODIFIER MOBILITY: CI
CLIMB 3 TO 5 STEPS WITH RAILING: A LITTLE
MOBILITY SCORE: 23

## 2023-08-16 ASSESSMENT — LIFESTYLE VARIABLES
HAVE YOU EVER FELT YOU SHOULD CUT DOWN ON YOUR DRINKING: NO
EVER FELT BAD OR GUILTY ABOUT YOUR DRINKING: NO
EVER HAD A DRINK FIRST THING IN THE MORNING TO STEADY YOUR NERVES TO GET RID OF A HANGOVER: NO
ALCOHOL_USE: NO
TOTAL SCORE: 0
ON A TYPICAL DAY WHEN YOU DRINK ALCOHOL HOW MANY DRINKS DO YOU HAVE: 0
CONSUMPTION TOTAL: NEGATIVE
TOTAL SCORE: 0
HAVE PEOPLE ANNOYED YOU BY CRITICIZING YOUR DRINKING: NO
HOW MANY TIMES IN THE PAST YEAR HAVE YOU HAD 5 OR MORE DRINKS IN A DAY: 0
TOTAL SCORE: 0
AVERAGE NUMBER OF DAYS PER WEEK YOU HAVE A DRINK CONTAINING ALCOHOL: 0

## 2023-08-16 ASSESSMENT — ENCOUNTER SYMPTOMS
STRIDOR: 0
SPUTUM PRODUCTION: 0
TINGLING: 0
DIZZINESS: 0
CHILLS: 0
COUGH: 0
WEAKNESS: 0
LOSS OF CONSCIOUSNESS: 0
MYALGIAS: 0
HEADACHES: 0
DIARRHEA: 0
DEPRESSION: 0
PALPITATIONS: 0
SHORTNESS OF BREATH: 0
ABDOMINAL PAIN: 1
VOMITING: 1
FALLS: 0
NAUSEA: 1
FEVER: 0
CONSTIPATION: 0

## 2023-08-16 ASSESSMENT — FIBROSIS 4 INDEX
FIB4 SCORE: 0.58

## 2023-08-16 ASSESSMENT — PATIENT HEALTH QUESTIONNAIRE - PHQ9
2. FEELING DOWN, DEPRESSED, IRRITABLE, OR HOPELESS: NOT AT ALL
1. LITTLE INTEREST OR PLEASURE IN DOING THINGS: NOT AT ALL
SUM OF ALL RESPONSES TO PHQ9 QUESTIONS 1 AND 2: 0
1. LITTLE INTEREST OR PLEASURE IN DOING THINGS: NOT AT ALL
2. FEELING DOWN, DEPRESSED, IRRITABLE, OR HOPELESS: NOT AT ALL
SUM OF ALL RESPONSES TO PHQ9 QUESTIONS 1 AND 2: 0

## 2023-08-16 ASSESSMENT — PAIN DESCRIPTION - PAIN TYPE: TYPE: ACUTE PAIN

## 2023-08-17 ENCOUNTER — APPOINTMENT (OUTPATIENT)
Dept: RADIOLOGY | Facility: MEDICAL CENTER | Age: 43
DRG: 371 | End: 2023-08-17
Attending: INTERNAL MEDICINE
Payer: COMMERCIAL

## 2023-08-17 ENCOUNTER — ANESTHESIA EVENT (OUTPATIENT)
Dept: SURGERY | Facility: MEDICAL CENTER | Age: 43
DRG: 371 | End: 2023-08-17
Payer: COMMERCIAL

## 2023-08-17 PROBLEM — D75.839 THROMBOCYTOSIS: Status: ACTIVE | Noted: 2023-08-17

## 2023-08-17 PROBLEM — K65.1 INTRA-ABDOMINAL ABSCESS (HCC): Status: ACTIVE | Noted: 2023-08-17

## 2023-08-17 PROBLEM — E87.6 HYPOKALEMIA: Status: ACTIVE | Noted: 2023-08-17

## 2023-08-17 LAB
ALBUMIN SERPL BCP-MCNC: 3.1 G/DL (ref 3.2–4.9)
ALBUMIN/GLOB SERPL: 0.8 G/DL
ALP SERPL-CCNC: 84 U/L (ref 30–99)
ALT SERPL-CCNC: 9 U/L (ref 2–50)
ANION GAP SERPL CALC-SCNC: 13 MMOL/L (ref 7–16)
AST SERPL-CCNC: 16 U/L (ref 12–45)
BILIRUB SERPL-MCNC: 0.3 MG/DL (ref 0.1–1.5)
BUN SERPL-MCNC: 12 MG/DL (ref 8–22)
CALCIUM ALBUM COR SERPL-MCNC: 9.2 MG/DL (ref 8.5–10.5)
CALCIUM SERPL-MCNC: 8.5 MG/DL (ref 8.4–10.2)
CHLORIDE SERPL-SCNC: 102 MMOL/L (ref 96–112)
CO2 SERPL-SCNC: 22 MMOL/L (ref 20–33)
CREAT SERPL-MCNC: 0.49 MG/DL (ref 0.5–1.4)
ERYTHROCYTE [DISTWIDTH] IN BLOOD BY AUTOMATED COUNT: 41.8 FL (ref 35.9–50)
GFR SERPLBLD CREATININE-BSD FMLA CKD-EPI: 122 ML/MIN/1.73 M 2
GLOBULIN SER CALC-MCNC: 3.8 G/DL (ref 1.9–3.5)
GLUCOSE SERPL-MCNC: 85 MG/DL (ref 65–99)
HCT VFR BLD AUTO: 32.5 % (ref 37–47)
HGB BLD-MCNC: 10.6 G/DL (ref 12–16)
MCH RBC QN AUTO: 30.9 PG (ref 27–33)
MCHC RBC AUTO-ENTMCNC: 32.6 G/DL (ref 32.2–35.5)
MCV RBC AUTO: 94.8 FL (ref 81.4–97.8)
PLATELET # BLD AUTO: 428 K/UL (ref 164–446)
PMV BLD AUTO: 8.6 FL (ref 9–12.9)
POTASSIUM SERPL-SCNC: 3.5 MMOL/L (ref 3.6–5.5)
PROT SERPL-MCNC: 6.9 G/DL (ref 6–8.2)
RBC # BLD AUTO: 3.43 M/UL (ref 4.2–5.4)
SODIUM SERPL-SCNC: 137 MMOL/L (ref 135–145)
WBC # BLD AUTO: 8.1 K/UL (ref 4.8–10.8)

## 2023-08-17 PROCEDURE — A9270 NON-COVERED ITEM OR SERVICE: HCPCS | Performed by: INTERNAL MEDICINE

## 2023-08-17 PROCEDURE — 770001 HCHG ROOM/CARE - MED/SURG/GYN PRIV*

## 2023-08-17 PROCEDURE — 80053 COMPREHEN METABOLIC PANEL: CPT

## 2023-08-17 PROCEDURE — 85027 COMPLETE CBC AUTOMATED: CPT

## 2023-08-17 PROCEDURE — 700111 HCHG RX REV CODE 636 W/ 250 OVERRIDE (IP): Mod: JZ | Performed by: INTERNAL MEDICINE

## 2023-08-17 PROCEDURE — 36415 COLL VENOUS BLD VENIPUNCTURE: CPT

## 2023-08-17 PROCEDURE — 76705 ECHO EXAM OF ABDOMEN: CPT

## 2023-08-17 PROCEDURE — 700111 HCHG RX REV CODE 636 W/ 250 OVERRIDE (IP): Performed by: INTERNAL MEDICINE

## 2023-08-17 PROCEDURE — 700102 HCHG RX REV CODE 250 W/ 637 OVERRIDE(OP): Performed by: INTERNAL MEDICINE

## 2023-08-17 PROCEDURE — 700105 HCHG RX REV CODE 258: Performed by: INTERNAL MEDICINE

## 2023-08-17 PROCEDURE — 94760 N-INVAS EAR/PLS OXIMETRY 1: CPT

## 2023-08-17 PROCEDURE — 99233 SBSQ HOSP IP/OBS HIGH 50: CPT | Performed by: INTERNAL MEDICINE

## 2023-08-17 RX ORDER — HYDROMORPHONE HYDROCHLORIDE 1 MG/ML
1 INJECTION, SOLUTION INTRAMUSCULAR; INTRAVENOUS; SUBCUTANEOUS ONCE
Status: COMPLETED | OUTPATIENT
Start: 2023-08-17 | End: 2023-08-17

## 2023-08-17 RX ORDER — HYDROMORPHONE HYDROCHLORIDE 1 MG/ML
1 INJECTION, SOLUTION INTRAMUSCULAR; INTRAVENOUS; SUBCUTANEOUS
Status: DISCONTINUED | OUTPATIENT
Start: 2023-08-17 | End: 2023-08-20 | Stop reason: HOSPADM

## 2023-08-17 RX ADMIN — OXYCODONE HYDROCHLORIDE 10 MG: 10 TABLET ORAL at 12:55

## 2023-08-17 RX ADMIN — POTASSIUM CHLORIDE 40 MEQ: 1500 TABLET, EXTENDED RELEASE ORAL at 05:16

## 2023-08-17 RX ADMIN — HYDROMORPHONE HYDROCHLORIDE 1 MG: 1 INJECTION, SOLUTION INTRAMUSCULAR; INTRAVENOUS; SUBCUTANEOUS at 19:53

## 2023-08-17 RX ADMIN — ONDANSETRON 4 MG: 4 TABLET, ORALLY DISINTEGRATING ORAL at 07:48

## 2023-08-17 RX ADMIN — OXYCODONE HYDROCHLORIDE 10 MG: 10 TABLET ORAL at 07:47

## 2023-08-17 RX ADMIN — DIPHENHYDRAMINE HYDROCHLORIDE 25 MG: 50 INJECTION, SOLUTION INTRAMUSCULAR; INTRAVENOUS at 07:48

## 2023-08-17 RX ADMIN — CEFTRIAXONE SODIUM 2000 MG: 2 INJECTION, POWDER, FOR SOLUTION INTRAMUSCULAR; INTRAVENOUS at 17:13

## 2023-08-17 RX ADMIN — OXYCODONE HYDROCHLORIDE 10 MG: 10 TABLET ORAL at 03:45

## 2023-08-17 RX ADMIN — SODIUM CHLORIDE: 9 INJECTION, SOLUTION INTRAVENOUS at 00:04

## 2023-08-17 RX ADMIN — SODIUM CHLORIDE: 9 INJECTION, SOLUTION INTRAVENOUS at 11:27

## 2023-08-17 RX ADMIN — OXYCODONE HYDROCHLORIDE 10 MG: 10 TABLET ORAL at 18:25

## 2023-08-17 RX ADMIN — SENNOSIDES AND DOCUSATE SODIUM 2 TABLET: 50; 8.6 TABLET ORAL at 18:26

## 2023-08-17 RX ADMIN — DIPHENHYDRAMINE HYDROCHLORIDE 25 MG: 50 INJECTION, SOLUTION INTRAMUSCULAR; INTRAVENOUS at 18:25

## 2023-08-17 RX ADMIN — HYDROMORPHONE HYDROCHLORIDE 1 MG: 1 INJECTION, SOLUTION INTRAMUSCULAR; INTRAVENOUS; SUBCUTANEOUS at 14:28

## 2023-08-17 RX ADMIN — POTASSIUM CHLORIDE 40 MEQ: 1500 TABLET, EXTENDED RELEASE ORAL at 00:03

## 2023-08-17 RX ADMIN — SODIUM CHLORIDE: 9 INJECTION, SOLUTION INTRAVENOUS at 20:00

## 2023-08-17 RX ADMIN — NIFEDIPINE 30 MG: 30 TABLET, EXTENDED RELEASE ORAL at 05:15

## 2023-08-17 RX ADMIN — SENNOSIDES AND DOCUSATE SODIUM 2 TABLET: 50; 8.6 TABLET ORAL at 00:02

## 2023-08-17 RX ADMIN — MORPHINE SULFATE 4 MG: 4 INJECTION INTRAVENOUS at 09:25

## 2023-08-17 RX ADMIN — ONDANSETRON 4 MG: 4 TABLET, ORALLY DISINTEGRATING ORAL at 03:44

## 2023-08-17 RX ADMIN — METRONIDAZOLE 500 MG: 5 INJECTION, SOLUTION INTRAVENOUS at 18:29

## 2023-08-17 RX ADMIN — METRONIDAZOLE 500 MG: 5 INJECTION, SOLUTION INTRAVENOUS at 05:13

## 2023-08-17 RX ADMIN — OXYCODONE HYDROCHLORIDE 5 MG: 5 TABLET ORAL at 00:12

## 2023-08-17 ASSESSMENT — PATIENT HEALTH QUESTIONNAIRE - PHQ9
1. LITTLE INTEREST OR PLEASURE IN DOING THINGS: NOT AT ALL
SUM OF ALL RESPONSES TO PHQ9 QUESTIONS 1 AND 2: 0
2. FEELING DOWN, DEPRESSED, IRRITABLE, OR HOPELESS: NOT AT ALL
2. FEELING DOWN, DEPRESSED, IRRITABLE, OR HOPELESS: NOT AT ALL
1. LITTLE INTEREST OR PLEASURE IN DOING THINGS: NOT AT ALL
SUM OF ALL RESPONSES TO PHQ9 QUESTIONS 1 AND 2: 0

## 2023-08-17 ASSESSMENT — PAIN DESCRIPTION - PAIN TYPE
TYPE: ACUTE PAIN

## 2023-08-17 ASSESSMENT — ENCOUNTER SYMPTOMS
VOMITING: 0
DIZZINESS: 0
DIARRHEA: 0
NERVOUS/ANXIOUS: 1
CONSTIPATION: 0
BLOOD IN STOOL: 0
WEIGHT LOSS: 1
NAUSEA: 1
PALPITATIONS: 0
MUSCULOSKELETAL NEGATIVE: 1
VOMITING: 1
CHILLS: 0
ABDOMINAL PAIN: 1
CARDIOVASCULAR NEGATIVE: 1
BACK PAIN: 0
FEVER: 0
SHORTNESS OF BREATH: 0
HEADACHES: 0
MEMORY LOSS: 0
COUGH: 0
RESPIRATORY NEGATIVE: 1
EYES NEGATIVE: 1

## 2023-08-17 NOTE — ED PROVIDER NOTES
ED Provider Note    CHIEF COMPLAINT  Chief Complaint   Patient presents with    Abdominal Pain     Started a couple weeks ago, starting to get worst. Generalized abdominal pain, mostly left flank pain.     N/V     Pt states she hasn't really been able to eat b/c of the abdominal pain and the n/v.        EXTERNAL RECORDS REVIEWED  Other Patient admitted for vaginal delivery in 2021.    HPI/ROS  LIMITATION TO HISTORY   Select: : None  OUTSIDE HISTORIAN(S):  None    Megan Nicole is a 42 y.o. female who presents with abdominal pain.  It started a couple weeks ago however has become significantly worse.  She says it started on the right side and now radiates all throughout her abdomen all the way to her left flank.  She says that it the pain has been persistent since symptoms started.  The past few days she has developed nausea and vomiting and has been unable to tolerate any oral intake.  She has had some loose watery stools the past few days but denies any black or bloody stool.  No hematemesis.  She denies any fevers or chills.  No dysuria, hematuria or urgency.  She denies any prior abdominal surgical history.  She denies any dysuria, hematuria or urgency.  He does report a history of pancreatitis in the setting of alcohol use.  She says that this feels more severe.    PAST MEDICAL HISTORY   has a past medical history of Hypertension.    SURGICAL HISTORY  patient denies any surgical history    FAMILY HISTORY  No family history on file.    SOCIAL HISTORY  Social History     Tobacco Use    Smoking status: Never    Smokeless tobacco: Never   Vaping Use    Vaping Use: Never used   Substance and Sexual Activity    Alcohol use: Not Currently    Drug use: Never    Sexual activity: Not on file       CURRENT MEDICATIONS  Home Medications       Reviewed by Sukhdeep EchevarriaD (Pharmacist) on 08/16/23 at 2151  Med List Status: Complete     Medication Last Dose Status   NIFEdipine SR (PROCARDIA-XL) 30 MG tablet 8/15/2023 Active  "  Prenatal Multivit-Min-Fe-FA (PRE-DMITRI FORMULA PO) UNK Active                    ALLERGIES  Allergies   Allergen Reactions    Sulfa Drugs Hives and Rash     rash       PHYSICAL EXAM  VITAL SIGNS: /89   Pulse 83   Temp 37.2 °C (99 °F) (Temporal)   Resp 18   Ht 1.676 m (5' 6\")   Wt 56.5 kg (124 lb 9 oz)   LMP 2023 (Approximate)   SpO2 98%   BMI 20.10 kg/m²    Constitutional: Awake and alert . Uncomfortable appearing   HENT: Normal inspection  . Dry mucous membranes  Eyes: Normal inspection  Neck: Grossly normal range of motion.  Cardiovascular: Normal heart rate, Normal rhythm.  Symmetric peripheral pulses.   Thorax & Lungs: No respiratory distress, No wheezing, No rales, No rhonchi, No chest tenderness.   Abdomen: Soft, non-distended, she has diffuse TTP, no mass  Skin: No obvious rash.  Extremities: Warm, well perfused. No clubbing, cyanosis, edema   Neurologic: Grossly normal   Psychiatric: Normal for situation      DIAGNOSTIC STUDIES / PROCEDURES      LABS  Results for orders placed or performed during the hospital encounter of 23   CBC WITH DIFFERENTIAL   Result Value Ref Range    WBC 8.5 4.8 - 10.8 K/uL    RBC 3.86 (L) 4.20 - 5.40 M/uL    Hemoglobin 12.0 12.0 - 16.0 g/dL    Hematocrit 36.4 (L) 37.0 - 47.0 %    MCV 94.3 81.4 - 97.8 fL    MCH 31.1 27.0 - 33.0 pg    MCHC 33.0 32.2 - 35.5 g/dL    RDW 41.6 35.9 - 50.0 fL    Platelet Count 457 (H) 164 - 446 K/uL    MPV 8.2 (L) 9.0 - 12.9 fL    Neutrophils-Polys 82.20 (H) 44.00 - 72.00 %    Lymphocytes 13.10 (L) 22.00 - 41.00 %    Monocytes 3.80 0.00 - 13.40 %    Eosinophils 0.40 0.00 - 6.90 %    Basophils 0.10 0.00 - 1.80 %    Immature Granulocytes 0.40 0.00 - 0.90 %    Nucleated RBC 0.00 0.00 - 0.20 /100 WBC    Neutrophils (Absolute) 6.95 1.82 - 7.42 K/uL    Lymphs (Absolute) 1.11 1.00 - 4.80 K/uL    Monos (Absolute) 0.32 0.00 - 0.85 K/uL    Eos (Absolute) 0.03 0.00 - 0.51 K/uL    Baso (Absolute) 0.01 0.00 - 0.12 K/uL    Immature " Granulocytes (abs) 0.03 0.00 - 0.11 K/uL    NRBC (Absolute) 0.00 K/uL   COMP METABOLIC PANEL   Result Value Ref Range    Sodium 137 135 - 145 mmol/L    Potassium 3.3 (L) 3.6 - 5.5 mmol/L    Chloride 99 96 - 112 mmol/L    Co2 22 20 - 33 mmol/L    Anion Gap 16.0 7.0 - 16.0    Glucose 94 65 - 99 mg/dL    Bun 14 8 - 22 mg/dL    Creatinine 0.53 0.50 - 1.40 mg/dL    Calcium 9.4 8.4 - 10.2 mg/dL    Correct Calcium 9.6 8.5 - 10.5 mg/dL    AST(SGOT) 20 12 - 45 U/L    ALT(SGPT) 9 2 - 50 U/L    Alkaline Phosphatase 101 (H) 30 - 99 U/L    Total Bilirubin 0.4 0.1 - 1.5 mg/dL    Albumin 3.8 3.2 - 4.9 g/dL    Total Protein 7.8 6.0 - 8.2 g/dL    Globulin 4.0 (H) 1.9 - 3.5 g/dL    A-G Ratio 1.0 g/dL   LIPASE   Result Value Ref Range    Lipase 209 (H) 11 - 82 U/L   HCG QUAL SERUM   Result Value Ref Range    Beta-Hcg Qualitative Serum Negative Negative   URINALYSIS,CULTURE IF INDICATED    Specimen: Urine   Result Value Ref Range    Color Yellow     Character Cloudy (A)     Specific Gravity >=1.030 <1.035    Ph 6.5 5.0 - 8.0    Glucose Negative Negative mg/dL    Ketones >=80 (A) Negative mg/dL    Protein 30 (A) Negative mg/dL    Bilirubin Moderate (A) Negative    Nitrite Negative Negative    Leukocyte Esterase Negative Negative    Occult Blood Negative Negative    Micro Urine Req Microscopic    URINE MICROSCOPIC (W/UA)   Result Value Ref Range    WBC 0-2 /hpf    RBC 0-2 /hpf    Bacteria Few (A) None /hpf    Epithelial Cells Few Few /hpf   ESTIMATED GFR   Result Value Ref Range    GFR (CKD-EPI) 119 >60 mL/min/1.73 m 2         RADIOLOGY  I have independently interpreted the diagnostic imaging associated with this visit and am waiting the final reading from the radiologist.   My preliminary interpretation is as follows: Intraabdominal abscesses  Radiologist interpretation:   CT-ABDOMEN-PELVIS WITH   Final Result         1.  Fluid collection tracking along the gastric fundus and greater curvature the stomach. Smaller similar appearing  fluid collection is seen near the gastric cardia. Could represent pseudocyst or loculated abdominal abscess.   2.  Slight hazy peripancreatic fat stranding favoring changes of pancreatitis.   3.  Area of low-density in the pancreatic head, could be related to suspected pancreatitis although pancreatic pseudocyst or other pancreatic mass is not excluded.   4.  Gastric wall thickening, appearance suggesting changes of gastritis otherwise indeterminate, could be related to suspected perigastric abscess or pseudocyst.   5.  Scattered abdominal ascites      US-RUQ    (Results Pending)         COURSE & MEDICAL DECISION MAKING    ED Observation Status? Yes; I am placing the patient in to an observation status due to a diagnostic uncertainty as well as therapeutic intensity. Patient placed in observation status at 7:29 PM, 8/16/2023.     Observation plan is as follows: IV, Labs, CT, Serial Exams    Upon Reevaluation, the patient's condition has: Not improved and will be admitted    Patient discharged from ED Observation status at 9:38 PM 8/16/2023    INITIAL ASSESSMENT, COURSE AND PLAN  Care Narrative: This is a 42-year-old female who presents with diffuse abdominal pain.  The pain started 2 weeks ago but has been progressively worsening and is associated with nausea and vomiting.  On arrival she is uncomfortable appearing but not systemically ill.  She is afebrile with normal vital signs.  Her labs are notable for hypokalemia with a potassium of 3.3.  She has normal liver function testing and normal renal function.  Her lipase is elevated at 209.  Urine with signs of dehydration and few bacteria but not clearly consistent with UTI.  Pregnancy test is negative and ectopic pregnancy ruled out.  She does not have any pain out of proportion and I doubt ischemic process.  I did obtain a CT scan to evaluate for other acute intra-abdominal process such as intra-abdominal infection, appendicitis, colitis, small bowel obstruction,  perforation or other surgical process.  This was notable for hazy peripancreatic fat stranding favoring changes consistent with pancreatitis.  She does also have likely associated abdominal abscess vs pseudocyst.  She also has gastric wall thickening containing which could be related to perigastric abscess or pseudocyst.  Patient does report a history of alcohol use which I suspect is the most likely underlying etiology.    9:38 PM  I discussed with Dr. Leonardo (Surgery). She reviewed the imaging. No indication for surgical intervention or drainage at this time.  She will need to follow-up with GI as an outpatient.  He felt that the patient was appropriate for conservative management at this time with antibiotics, pain control, fluids and clear liquid diet.    Patient was treated in the ER with Dilaudid, Zofran and IV fluids.  She did report improvement but was still having residual pain.   Blood cultures were obtained and she was started on ceftriaxone and Flagyl.  Just prior to antibiotic administration after the Dilaudid she did develop some hives for which she was given Benadryl.  She had no signs to suggest anaphylaxis.    She continued to have normal vital signs and overall did look systemically well.  I discussed the case with Dr. Avalos the hospitalist who will admit for further management.    HYDRATION: Based on the patient's presentation of Acute Vomiting the patient was given IV fluids. IV Hydration was used because oral hydration was not adequate alone. Upon recheck following hydration, the patient was improved.        DISPOSITION AND DISCUSSIONS  I have discussed management of the patient with the following physicians and ARNOLD's:  Dr. Leonardo (Surgery).  Dr. Avalos (Hospitalist)    Discussion of management with other [facility] or appropriate source(s): None     FINAL DIAGNOSIS  1. Nausea and vomiting, unspecified vomiting type Acute   2. Alcohol-induced acute pancreatitis, unspecified complication status Acute    3. Pancreatic pseudocyst Acute          Electronically signed by: Antonette Daniels M.D., 8/16/2023 7:29 PM

## 2023-08-17 NOTE — ASSESSMENT & PLAN NOTE
Continue home nifedipine  Start as needed labetalol  Adjust as needed  -Continue IV fluids, monitor closely after procedure she may become hypotensive

## 2023-08-17 NOTE — ED NOTES
Med Rec completed per patient   Allergies reviewed  No ORAL antibiotics in last 30 days  Ok per patient to discuss medications with visitor present

## 2023-08-17 NOTE — ED NOTES
Report called to BRANDEN Ramirez.  All questions and concerns addressed.     just got done with the blood cultures x 2 at two different drawn. Will start the Ceftriaxone then will need the Flagyl abx.  Ashley OTERO updated with her plan of care.

## 2023-08-17 NOTE — ED TRIAGE NOTES
"Pt ambulated to triage with the following c/o    Chief Complaint   Patient presents with    Abdominal Pain     Started a couple weeks ago, starting to get worst. Generalized abdominal pain, mostly left flank pain.     N/V     Pt states she hasn't really been able to eat b/c of the abdominal pain and the n/v.      BP (!) 135/102   Pulse 97   Temp 36.6 °C (97.8 °F) (Temporal)   Resp 16   Ht 1.676 m (5' 6\")   Wt 56.5 kg (124 lb 9 oz)   LMP 08/01/2023 (Approximate)   SpO2 96%   BMI 20.10 kg/m²       "

## 2023-08-17 NOTE — PROGRESS NOTES
Bedside report received from night RN. Assumed care of patient. Alert and oriented X4, able to make needs known. VSS. Daily plan of care discussed. Pt complains of 8/10 pain, nausea and itching, medicated per MAR. Call light and personal belongings within reach. Hourly rounding in place.     0815 Pt resting in bed, no needs at this time    0929 Pt states she is still having sharp pain in her abdomen, medicated per MAR    1018 Pt in bed, Ultrasound ongoing.     1127 Pt awake in bed, visitors at bedside  1255 Pt c/o pain, medicated per MAR  1550 Pt resting in bed, normal respirations.    1620 Pt resting in bed, no needs at this time.   1825 Pt c/o pain, medicated per MAR.    Report given to NOC RN.

## 2023-08-17 NOTE — PROGRESS NOTES
2235= patient arrived from er, vitals taken  0000= patient resting  0200= gave patient ice packs  0450= vitals taken

## 2023-08-17 NOTE — PROGRESS NOTES
Received report from night shift RN. Pt is A&O x4, self to SBA because of IV, NPO at the moment. Precautions in place bed in the lowest position and call light within reach.    0700: Seen pt at bedside. Pt is awake requesting meds RN notified. Pt is comfortably in bed no other needs at the moment.     0800: Pt is asleep chest observed rising up/down and breathing. Pt still remains NPO.     0900: Pt c/o abdominal pain RN notified. No other needs at the moment.     1000:Pt is asleep chest observed rising up/down and breathing.  1018: Ultrasound tech at bedside.    1100: Pt is comfortably in bed with family at bedside.     1200:Pt is comfortably in bed with family at bedside.     1300: Pt is comfortably in bed on her phone. Pt still remains NPO.  1312: IV air in line RN notified.    1400:Pt is comfortably in bed on her phone.   1407: RN notified CNA pt is on clear liquid diet now. RN called and ordered from kitchen.     1500: Pt is awake watching TV comfortably in bed.    1600: MD at bedside. Pt is awake watching TV comfortably in bed.   1635: CNA took vitals.     1700: Pt is awake eating dinner comfortably in bed.     1800: Pt is awake eating dinner comfortably in bed.  1808: Pt c/o pain. RN notified.     1900: RN to night shift CNA report given.

## 2023-08-17 NOTE — ASSESSMENT & PLAN NOTE
Likely due to alcohol abuse however patient states she did quit drinking again 1 month ago which would be at least 2 weeks prior to onset of symptoms  Lipase trended down  Pain more from pseudocyst mass effect onto stomach

## 2023-08-17 NOTE — ASSESSMENT & PLAN NOTE
Multiple fluid collections noted on CT scan  ERP did discuss the case with general surgery who will follow along, stated no need for drainage at this time, recommended IV antibiotics  Not causing sepsis  Await culture results    Dr. Leonardo notified by EDP.    She stated she has been stressed. She has lost about 10lbs unintentionally. She has had ongoing abdominal pains and could only eat soups.  - She mentioned she was going to see [facility] specialist, but has not been able to get an appointment yet.  I have reached out to [facility] for evaluation, concerning for gastric abscess or potential neoplasm.  - I reviewed RUQ US, CBD measured 0.66cm not dilated, final reading pending by radiologist.    8/18:  We are pending EGD today 1:45PM  8/19: Status post EGD, which found pancreatic cyst pushing on to the stomach.  Stomach also has signs of erythema possibly gastritis.  I spoke with GI, will obtain KUB in the morning.

## 2023-08-17 NOTE — PROGRESS NOTES
4 Eyes Skin Assessment Completed by BRANDEN naylor and BRANDEN hwang.    Head WDL  Ears WDL  Nose WDL  Mouth WDL  Neck WDL  Breast/Chest Redness and Rash and redness at the back   Shoulder Blades WDL  Spine WDL  (R) Arm/Elbow/Hand Redness and Rash  (L) Arm/Elbow/Hand Redness and Rash  Abdomen WDL with button belly piercing  Groin WDL  Scrotum/Coccyx/Buttocks WDL  (R) Leg WDL  (L) Leg WDL  (R) Heel/Foot/Toe WDL w/ toe ring on the 2nd digit.   (L) Heel/Foot/Toe WDL          Devices In Places Pulse Ox      Interventions In Place Pillows    Possible Skin Injury No    Pictures Uploaded Into Epic N/A  Wound Consult Placed N/A  RN Wound Prevention Protocol Ordered No

## 2023-08-17 NOTE — ED NOTES
More pain medication given as ordered.    MD updated patient with her plan of care with verbal understanding

## 2023-08-17 NOTE — PROGRESS NOTES
2300 - pulse ox on, normal O2 sats, IV Flagyl started.   0000- pt sleeping in bed, O2 sats WNL  0200- pt sleeping in bed, normal respirations  0339 - pt sleeping in bed, O2 sats WNL-  0347 - [t awake, VSS, prn pain meds requested, given.  0600-pt awake, resting in bed, noraml respirations

## 2023-08-17 NOTE — PROGRESS NOTES
Hospital Medicine Daily Progress Note    Date of Service  8/17/2023    Chief Complaint  Megan Nicole is a 42 y.o. female admitted 8/16/2023 with   Chief Complaint   Patient presents with    Abdominal Pain     Started a couple weeks ago, starting to get worst. Generalized abdominal pain, mostly left flank pain.     N/V     Pt states she hasn't really been able to eat b/c of the abdominal pain and the n/v.      Hospital Course  No notes on file    Interval Problem Update  Patient reporting severe pain.  She stated she has had pancreatitis before, she did drink alcohol but drinks maybe 3 times per week, maybe 2-4 glasses of wine during a session. She stated she has been stressed. She has lost about 10lbs unintentionally. She has had ongoing abdominal pains and could only eat soups.   - I reviewed CT A/P at bedside, I showed patient her gastric imaging with a thickened fundus area. She denied any Fhx of gastric cancers. I reviewed images of her pancreatitis.  We discussed ETOH cessation completely as this will trigger future pancreatitis.  - She mentioned she was going to see [facility] specialist, but has not been able to get an appointment yet.  I have reached out to [facility] for evaluation, concerning for gastric abscess or potential neoplasm.  - I will continue IV Ceftriaxone and IV Flagyl for potential abscess.  - I reviewed RUQ US, CBD measured 0.66cm not dilated, final reading pending by radiologist.    I have discussed this patient's plan of care and discharge plan at IDT rounds today with Case Management, Nursing, Nursing leadership, and other members of the IDT team.    Consultants/Specialty  GI    Code Status  Full Code    Disposition  The patient is not medically cleared for discharge to home or a post-acute facility.  Anticipate discharge to: home with close outpatient follow-up    I have placed the appropriate orders for post-discharge needs.    Review of Systems  Review of Systems   Constitutional:  Positive for  malaise/fatigue and weight loss. Negative for chills and fever.   Respiratory:  Negative for cough and shortness of breath.    Cardiovascular:  Negative for chest pain and palpitations.   Gastrointestinal:  Positive for abdominal pain and nausea. Negative for constipation, diarrhea and vomiting.   Musculoskeletal:  Negative for back pain and joint pain.   Neurological:  Negative for dizziness and headaches.   All other systems reviewed and are negative.       Physical Exam  Temp:  [36.3 °C (97.4 °F)-37.2 °C (99 °F)] 36.9 °C (98.5 °F)  Pulse:  [74-97] 85  Resp:  [16-18] 18  BP: (109-136)/() 109/68  SpO2:  [95 %-99 %] 96 %    Physical Exam  Vitals and nursing note reviewed.   Constitutional:       General: She is in acute distress.      Appearance: She is not ill-appearing.   HENT:      Head: Normocephalic and atraumatic.   Eyes:      General: No scleral icterus.     Extraocular Movements: Extraocular movements intact.   Cardiovascular:      Rate and Rhythm: Normal rate.      Pulses: Normal pulses.      Heart sounds: Normal heart sounds. No murmur heard.  Pulmonary:      Effort: Pulmonary effort is normal. No respiratory distress.      Breath sounds: Normal breath sounds.   Abdominal:      General: Bowel sounds are normal. There is distension.      Palpations: Abdomen is soft.      Tenderness: There is abdominal tenderness.   Musculoskeletal:         General: No swelling or tenderness. Normal range of motion.      Cervical back: Normal range of motion and neck supple.   Skin:     General: Skin is warm.      Capillary Refill: Capillary refill takes less than 2 seconds.      Coloration: Skin is not jaundiced.      Findings: No erythema.   Neurological:      General: No focal deficit present.      Mental Status: She is alert and oriented to person, place, and time. Mental status is at baseline.      Motor: No weakness.   Psychiatric:         Mood and Affect: Mood normal.         Behavior: Behavior normal.          Thought Content: Thought content normal.         Judgment: Judgment normal.         Fluids    Intake/Output Summary (Last 24 hours) at 8/17/2023 1433  Last data filed at 8/17/2023 0746  Gross per 24 hour   Intake 30 ml   Output 400 ml   Net -370 ml       Laboratory  Recent Labs     08/16/23 2014 08/17/23  0027   WBC 8.5 8.1   RBC 3.86* 3.43*   HEMOGLOBIN 12.0 10.6*   HEMATOCRIT 36.4* 32.5*   MCV 94.3 94.8   MCH 31.1 30.9   MCHC 33.0 32.6   RDW 41.6 41.8   PLATELETCT 457* 428   MPV 8.2* 8.6*     Recent Labs     08/16/23 2014 08/17/23  0027   SODIUM 137 137   POTASSIUM 3.3* 3.5*   CHLORIDE 99 102   CO2 22 22   GLUCOSE 94 85   BUN 14 12   CREATININE 0.53 0.49*   CALCIUM 9.4 8.5                   Imaging  CT-ABDOMEN-PELVIS WITH   Final Result         1.  Fluid collection tracking along the gastric fundus and greater curvature the stomach. Smaller similar appearing fluid collection is seen near the gastric cardia. Could represent pseudocyst or loculated abdominal abscess.   2.  Slight hazy peripancreatic fat stranding favoring changes of pancreatitis.   3.  Area of low-density in the pancreatic head, could be related to suspected pancreatitis although pancreatic pseudocyst or other pancreatic mass is not excluded.   4.  Gastric wall thickening, appearance suggesting changes of gastritis otherwise indeterminate, could be related to suspected perigastric abscess or pseudocyst.   5.  Scattered abdominal ascites      US-RUQ    (Results Pending)        Assessment/Plan  * Recurrent acute pancreatitis- (present on admission)  Assessment & Plan  Likely due to alcohol abuse however patient states she did quit drinking again 1 month ago which would be at least 2 weeks prior to onset of symptoms  Because of this, will obtain a right upper quadrant ultrasound for further evaluation however this is most likely alcohol induced  Associated with abdominal abscesses   patient will be started on IV Rocephin and Flagyl  Close monitor for  signs of a fungal infection  Multimodal pain management     I reviewed images of her pancreatitis.  We discussed ETOH cessation completely as this will trigger future pancreatitis.  Patient would like to try clear liquid diet, I have changed orders.  NPO at MN    Thrombocytosis- (present on admission)  Assessment & Plan  Mild, likely due to dehydration  plt 428  Continue IVF    Hypokalemia- (present on admission)  Assessment & Plan  K 3.5  I am repeating labs in the morning.    Intra-abdominal abscess (HCC)- (present on admission)  Assessment & Plan  Multiple fluid collections noted on CT scan  ERP did discuss the case with general surgery who will follow along, stated no need for drainage at this time, recommended IV antibiotics  Not causing sepsis  Await culture results    Dr. Leonardo notified by EDP.    She stated she has been stressed. She has lost about 10lbs unintentionally. She has had ongoing abdominal pains and could only eat soups.  - She mentioned she was going to see [facility] specialist, but has not been able to get an appointment yet.  I have reached out to [facility] for evaluation, concerning for gastric abscess or potential neoplasm.  - I reviewed RUQ US, CBD measured 0.66cm not dilated, final reading pending by radiologist.    HTN (hypertension)- (present on admission)  Assessment & Plan  Continue home nifedipine  Start as needed labetalol  Adjust as needed         VTE prophylaxis:   SCDs/TEDs      I have performed a physical exam and reviewed and updated ROS and Plan today (8/17/2023). In review of yesterday's note (8/16/2023), there are no changes except as documented above.      Total time spent 51 minutes. I spent greater than 50% of the time for patient care, counseling, and coordination on this date, including unit/floor time, and face-to-face time with the patient as per interval events, my own review of patient's imaging and lab analysis and developing my assessment and plan above.

## 2023-08-17 NOTE — ED NOTES
[facility] notified RN and MD about the patient's rash to both UE.      No new orders at this time.

## 2023-08-17 NOTE — CARE PLAN
The patient is Stable - Low risk of patient condition declining or worsening    Shift Goals  Clinical Goals: Pt's pain will be tolerable at 3/10 at end of shift; NPO w/ sips w/ meds; continue on IV abx and IV fluids.  Patient Goals: sleep and pain mgt.  Family Goals: na    Progress made toward(s) clinical / shift goals:      Pt's pain was tolerable at end of shift; continued on IV abx and IV fluids, no adverse reactions noted; continued on NPO w/ sips w/ meds, may have ice chips as ordered.     No nausea and vomiting noted.     Patient is not progressing towards the following goals:

## 2023-08-17 NOTE — PROGRESS NOTES
2225 - received report from ER nurse Mannie. ER nurse stated that rashes were already noted at the ER and pt stated that she did not consider the meds given as the reason for the rashes.     2235 - Pt arrived via gurney, admitted to room 112 from ED. Pt is A&O x 4, ambulatory, RA, complains of abdominal pain at 2/10, declines. Pt denies nausea at this time. Pt oriented to room and call light. POC reviewed which includes NPO w/ sips w/ meds, ice chips ok, IV abx, and pain management. Call light within reach, fall precautions in place, all needs met at this time.

## 2023-08-17 NOTE — H&P
Hospital Medicine History & Physical Note    Date of Service  8/16/2023    Primary Care Physician  Pcp Pt States None    Consultants  general surgery    Specialist Names: Dr Leonardo    Code Status  Full Code    Chief Complaint  Chief Complaint   Patient presents with    Abdominal Pain     Started a couple weeks ago, starting to get worst. Generalized abdominal pain, mostly left flank pain.     N/V     Pt states she hasn't really been able to eat b/c of the abdominal pain and the n/v.        History of Presenting Illness  Megan Nicole is a 42 y.o. female who presented 8/16/2023 with abdominal pain.  She states the pain started approximately 2 weeks ago.  She states initially it was epigastric and right upper quadrant, now it has transition to epigastric and left upper quadrant.  She states that sharp and severe at times.  She does complain of nausea and vomiting as well.  She does have a history of alcoholic pancreatitis, she was recently drinking again however she states she stopped a month ago.  Upon arrival, patient did have a CT scan with fluid collections.  ERP did discuss the case with surgery who stated no need to drain however they did recommend antibiotics thinking that they were abscesses.  I did discuss the case including labs and imaging with ER physician.    I discussed the plan of care with patient and family.    Review of Systems  Review of Systems   Constitutional:  Negative for chills, fever and malaise/fatigue.   HENT:  Negative for congestion.    Respiratory:  Negative for cough, sputum production, shortness of breath and stridor.    Cardiovascular:  Negative for chest pain, palpitations and leg swelling.   Gastrointestinal:  Positive for abdominal pain, nausea and vomiting. Negative for constipation and diarrhea.   Genitourinary:  Negative for dysuria and urgency.   Musculoskeletal:  Negative for falls and myalgias.   Neurological:  Negative for dizziness, tingling, loss of consciousness, weakness  and headaches.   Psychiatric/Behavioral:  Negative for depression and suicidal ideas.    All other systems reviewed and are negative.      Past Medical History   has a past medical history of Hypertension.    Surgical History   has no past surgical history on file.     Family History  family history is not on file.   Family history reviewed with patient. There is no family history that is pertinent to the chief complaint.     Social History   reports that she has never smoked. She has never used smokeless tobacco. She reports that she does not currently use alcohol. She reports that she does not use drugs.    Allergies  Allergies   Allergen Reactions    Sulfa Drugs Hives and Rash     rash       Medications  Prior to Admission Medications   Prescriptions Last Dose Informant Patient Reported? Taking?   NIFEdipine SR (PROCARDIA-XL) 30 MG tablet 8/15/2023  Yes No   Sig: Take 30 mg by mouth every day. Indications: High Blood Pressure Disorder   Prenatal Multivit-Min-Fe-FA (PRE-DMITRI FORMULA PO) UNK  Yes No   Sig: Take 1 Tablet by mouth every day.      Facility-Administered Medications: None       Physical Exam  Temp:  [36.3 °C (97.4 °F)-37.2 °C (99 °F)] 37.2 °C (99 °F)  Pulse:  [83-97] 83  Resp:  [16-18] 18  BP: (116-135)/() 132/89  SpO2:  [95 %-98 %] 98 %  Blood Pressure: 132/89   Temperature: 37.2 °C (99 °F)   Pulse: 83   Respiration: 18   Pulse Oximetry: 98 %       Physical Exam  Vitals and nursing note reviewed.   Constitutional:       General: She is not in acute distress.     Appearance: She is well-developed. She is not diaphoretic.   HENT:      Head: Normocephalic and atraumatic.      Right Ear: External ear normal.      Left Ear: External ear normal.      Nose: Nose normal. No congestion or rhinorrhea.      Mouth/Throat:      Mouth: Mucous membranes are dry.      Pharynx: No oropharyngeal exudate.   Eyes:      General:         Right eye: No discharge.         Left eye: No discharge.   Neck:      Trachea:  "No tracheal deviation.   Cardiovascular:      Rate and Rhythm: Normal rate and regular rhythm.      Heart sounds: No murmur heard.     No friction rub. No gallop.   Pulmonary:      Effort: Pulmonary effort is normal. No respiratory distress.      Breath sounds: Normal breath sounds. No stridor. No wheezing or rales.   Chest:      Chest wall: No tenderness.   Abdominal:      General: Bowel sounds are normal. There is no distension.      Palpations: Abdomen is soft.      Tenderness: There is abdominal tenderness in the right upper quadrant, epigastric area and left upper quadrant.   Musculoskeletal:         General: No tenderness. Normal range of motion.      Cervical back: Neck supple.      Right lower leg: No edema.      Left lower leg: No edema.   Lymphadenopathy:      Cervical: No cervical adenopathy.   Skin:     General: Skin is warm and dry.      Findings: No erythema or rash.   Neurological:      Mental Status: She is alert and oriented to person, place, and time.      Cranial Nerves: No cranial nerve deficit.   Psychiatric:         Mood and Affect: Mood normal.         Behavior: Behavior normal.         Thought Content: Thought content normal.         Judgment: Judgment normal.         Laboratory:  Recent Labs     08/16/23 2014   WBC 8.5   RBC 3.86*   HEMOGLOBIN 12.0   HEMATOCRIT 36.4*   MCV 94.3   MCH 31.1   MCHC 33.0   RDW 41.6   PLATELETCT 457*   MPV 8.2*     Recent Labs     08/16/23 2014   SODIUM 137   POTASSIUM 3.3*   CHLORIDE 99   CO2 22   GLUCOSE 94   BUN 14   CREATININE 0.53   CALCIUM 9.4     Recent Labs     08/16/23 2014   ALTSGPT 9   ASTSGOT 20   ALKPHOSPHAT 101*   TBILIRUBIN 0.4   LIPASE 209*   GLUCOSE 94         No results for input(s): \"NTPROBNP\" in the last 72 hours.      No results for input(s): \"TROPONINT\" in the last 72 hours.    Imaging:  CT-ABDOMEN-PELVIS WITH   Final Result         1.  Fluid collection tracking along the gastric fundus and greater curvature the stomach. Smaller similar " appearing fluid collection is seen near the gastric cardia. Could represent pseudocyst or loculated abdominal abscess.   2.  Slight hazy peripancreatic fat stranding favoring changes of pancreatitis.   3.  Area of low-density in the pancreatic head, could be related to suspected pancreatitis although pancreatic pseudocyst or other pancreatic mass is not excluded.   4.  Gastric wall thickening, appearance suggesting changes of gastritis otherwise indeterminate, could be related to suspected perigastric abscess or pseudocyst.   5.  Scattered abdominal ascites          no X-Ray or EKG requiring interpretation    Assessment/Plan:  Justification for Admission Status  I anticipate this patient will require at least two midnights for appropriate medical management, necessitating inpatient admission because acute pancreatitis with multiple abdominal abscesses    Patient will need a Med/Surg bed on MEDICAL service .  The need is secondary to acute pancreatitis, abdominal abscesses.    * Recurrent acute pancreatitis- (present on admission)  Assessment & Plan  Likely due to alcohol abuse however patient states she did quit drinking again 1 month ago which would be at least 2 weeks prior to onset of symptoms  Because of this, will obtain a right upper quadrant ultrasound for further evaluation however this is most likely alcohol induced  Associated with abdominal abscesses   patient will be started on IV Rocephin and Flagyl  Close monitor for signs of a fungal infection  Keep patient n.p.o.  Multimodal pain management    Thrombocytosis- (present on admission)  Assessment & Plan  Mild, likely due to dehydration  Start IV fluids  Repeat CBC in the morning    Hypokalemia- (present on admission)  Assessment & Plan  Mild, start oral potassium  Repeat BMP in the morning    Intra-abdominal abscess (HCC)- (present on admission)  Assessment & Plan  Multiple fluid collections noted on CT scan  ERP did discuss the case with general  surgery who will follow along, stated no need for drainage at this time, recommended IV antibiotics  Start Rocephin and Flagyl  Not causing sepsis  Await culture results    HTN (hypertension)- (present on admission)  Assessment & Plan  Continue home nifedipine  Start as needed labetalol  Adjust as needed        VTE prophylaxis: SCDs/TEDs

## 2023-08-18 ENCOUNTER — ANESTHESIA (OUTPATIENT)
Dept: SURGERY | Facility: MEDICAL CENTER | Age: 43
DRG: 371 | End: 2023-08-18
Payer: COMMERCIAL

## 2023-08-18 PROBLEM — E87.20 METABOLIC ACIDOSIS: Status: ACTIVE | Noted: 2023-08-18

## 2023-08-18 PROBLEM — E83.42 HYPOMAGNESEMIA: Status: ACTIVE | Noted: 2023-08-18

## 2023-08-18 LAB
ALBUMIN SERPL BCP-MCNC: 2.8 G/DL (ref 3.2–4.9)
ALBUMIN/GLOB SERPL: 0.8 G/DL
ALP SERPL-CCNC: 75 U/L (ref 30–99)
ALT SERPL-CCNC: 7 U/L (ref 2–50)
ANION GAP SERPL CALC-SCNC: 12 MMOL/L (ref 7–16)
AST SERPL-CCNC: 15 U/L (ref 12–45)
BILIRUB SERPL-MCNC: 0.2 MG/DL (ref 0.1–1.5)
BUN SERPL-MCNC: 5 MG/DL (ref 8–22)
CALCIUM ALBUM COR SERPL-MCNC: 9.4 MG/DL (ref 8.5–10.5)
CALCIUM SERPL-MCNC: 8.4 MG/DL (ref 8.4–10.2)
CHLORIDE SERPL-SCNC: 106 MMOL/L (ref 96–112)
CO2 SERPL-SCNC: 18 MMOL/L (ref 20–33)
CREAT SERPL-MCNC: 0.39 MG/DL (ref 0.5–1.4)
ERYTHROCYTE [DISTWIDTH] IN BLOOD BY AUTOMATED COUNT: 42.8 FL (ref 35.9–50)
GFR SERPLBLD CREATININE-BSD FMLA CKD-EPI: 128 ML/MIN/1.73 M 2
GLOBULIN SER CALC-MCNC: 3.3 G/DL (ref 1.9–3.5)
GLUCOSE SERPL-MCNC: 71 MG/DL (ref 65–99)
HCT VFR BLD AUTO: 31.5 % (ref 37–47)
HGB BLD-MCNC: 10.2 G/DL (ref 12–16)
LIPASE SERPL-CCNC: 112 U/L (ref 11–82)
MAGNESIUM SERPL-MCNC: 1.6 MG/DL (ref 1.5–2.5)
MCH RBC QN AUTO: 31.2 PG (ref 27–33)
MCHC RBC AUTO-ENTMCNC: 32.4 G/DL (ref 32.2–35.5)
MCV RBC AUTO: 96.3 FL (ref 81.4–97.8)
PATHOLOGY CONSULT NOTE: NORMAL
PHOSPHATE SERPL-MCNC: 2.2 MG/DL (ref 2.5–4.5)
PLATELET # BLD AUTO: 370 K/UL (ref 164–446)
PMV BLD AUTO: 8.7 FL (ref 9–12.9)
POTASSIUM SERPL-SCNC: 3.6 MMOL/L (ref 3.6–5.5)
PROCALCITONIN SERPL-MCNC: 0.02 NG/ML
PROT SERPL-MCNC: 6.1 G/DL (ref 6–8.2)
RBC # BLD AUTO: 3.27 M/UL (ref 4.2–5.4)
SODIUM SERPL-SCNC: 136 MMOL/L (ref 135–145)
WBC # BLD AUTO: 4.7 K/UL (ref 4.8–10.8)

## 2023-08-18 PROCEDURE — 700111 HCHG RX REV CODE 636 W/ 250 OVERRIDE (IP): Mod: JZ | Performed by: INTERNAL MEDICINE

## 2023-08-18 PROCEDURE — 160002 HCHG RECOVERY MINUTES (STAT): Performed by: INTERNAL MEDICINE

## 2023-08-18 PROCEDURE — 86038 ANTINUCLEAR ANTIBODIES: CPT

## 2023-08-18 PROCEDURE — 700102 HCHG RX REV CODE 250 W/ 637 OVERRIDE(OP): Performed by: INTERNAL MEDICINE

## 2023-08-18 PROCEDURE — 700111 HCHG RX REV CODE 636 W/ 250 OVERRIDE (IP): Performed by: INTERNAL MEDICINE

## 2023-08-18 PROCEDURE — 700105 HCHG RX REV CODE 258: Performed by: INTERNAL MEDICINE

## 2023-08-18 PROCEDURE — 82787 IGG 1 2 3 OR 4 EACH: CPT | Mod: 91

## 2023-08-18 PROCEDURE — 83735 ASSAY OF MAGNESIUM: CPT

## 2023-08-18 PROCEDURE — 36415 COLL VENOUS BLD VENIPUNCTURE: CPT

## 2023-08-18 PROCEDURE — 94760 N-INVAS EAR/PLS OXIMETRY 1: CPT

## 2023-08-18 PROCEDURE — 0DB78ZX EXCISION OF STOMACH, PYLORUS, VIA NATURAL OR ARTIFICIAL OPENING ENDOSCOPIC, DIAGNOSTIC: ICD-10-PCS | Performed by: INTERNAL MEDICINE

## 2023-08-18 PROCEDURE — 160009 HCHG ANES TIME/MIN: Performed by: INTERNAL MEDICINE

## 2023-08-18 PROCEDURE — 80053 COMPREHEN METABOLIC PANEL: CPT

## 2023-08-18 PROCEDURE — 160035 HCHG PACU - 1ST 60 MINS PHASE I: Performed by: INTERNAL MEDICINE

## 2023-08-18 PROCEDURE — 83690 ASSAY OF LIPASE: CPT

## 2023-08-18 PROCEDURE — 85027 COMPLETE CBC AUTOMATED: CPT

## 2023-08-18 PROCEDURE — 160048 HCHG OR STATISTICAL LEVEL 1-5: Performed by: INTERNAL MEDICINE

## 2023-08-18 PROCEDURE — C9113 INJ PANTOPRAZOLE SODIUM, VIA: HCPCS | Performed by: INTERNAL MEDICINE

## 2023-08-18 PROCEDURE — A9270 NON-COVERED ITEM OR SERVICE: HCPCS | Performed by: INTERNAL MEDICINE

## 2023-08-18 PROCEDURE — 88305 TISSUE EXAM BY PATHOLOGIST: CPT

## 2023-08-18 PROCEDURE — 0DB98ZX EXCISION OF DUODENUM, VIA NATURAL OR ARTIFICIAL OPENING ENDOSCOPIC, DIAGNOSTIC: ICD-10-PCS | Performed by: INTERNAL MEDICINE

## 2023-08-18 PROCEDURE — 700111 HCHG RX REV CODE 636 W/ 250 OVERRIDE (IP): Performed by: ANESTHESIOLOGY

## 2023-08-18 PROCEDURE — 99233 SBSQ HOSP IP/OBS HIGH 50: CPT | Performed by: INTERNAL MEDICINE

## 2023-08-18 PROCEDURE — 700111 HCHG RX REV CODE 636 W/ 250 OVERRIDE (IP): Mod: JZ | Performed by: ANESTHESIOLOGY

## 2023-08-18 PROCEDURE — 770001 HCHG ROOM/CARE - MED/SURG/GYN PRIV*

## 2023-08-18 PROCEDURE — 88312 SPECIAL STAINS GROUP 1: CPT

## 2023-08-18 PROCEDURE — 84100 ASSAY OF PHOSPHORUS: CPT

## 2023-08-18 PROCEDURE — 160202 HCHG ENDO MINUTES - 1ST 30 MINS LEVEL 3: Performed by: INTERNAL MEDICINE

## 2023-08-18 PROCEDURE — 84145 PROCALCITONIN (PCT): CPT

## 2023-08-18 RX ORDER — HYDROMORPHONE HYDROCHLORIDE 2 MG/1
4 TABLET ORAL
Status: DISCONTINUED | OUTPATIENT
Start: 2023-08-18 | End: 2023-08-20 | Stop reason: HOSPADM

## 2023-08-18 RX ORDER — HYDROMORPHONE HYDROCHLORIDE 2 MG/1
1 TABLET ORAL
Status: DISCONTINUED | OUTPATIENT
Start: 2023-08-18 | End: 2023-08-18

## 2023-08-18 RX ORDER — HYDROMORPHONE HYDROCHLORIDE 2 MG/1
2 TABLET ORAL
Status: DISCONTINUED | OUTPATIENT
Start: 2023-08-18 | End: 2023-08-18

## 2023-08-18 RX ORDER — PANTOPRAZOLE SODIUM 40 MG/10ML
40 INJECTION, POWDER, LYOPHILIZED, FOR SOLUTION INTRAVENOUS 2 TIMES DAILY
Status: DISCONTINUED | OUTPATIENT
Start: 2023-08-18 | End: 2023-08-20

## 2023-08-18 RX ORDER — DIPHENHYDRAMINE HYDROCHLORIDE 50 MG/ML
12.5 INJECTION INTRAMUSCULAR; INTRAVENOUS
Status: DISCONTINUED | OUTPATIENT
Start: 2023-08-18 | End: 2023-08-18 | Stop reason: HOSPADM

## 2023-08-18 RX ORDER — ONDANSETRON 2 MG/ML
INJECTION INTRAMUSCULAR; INTRAVENOUS PRN
Status: DISCONTINUED | OUTPATIENT
Start: 2023-08-18 | End: 2023-08-18 | Stop reason: SURG

## 2023-08-18 RX ORDER — HYDROMORPHONE HYDROCHLORIDE 1 MG/ML
0.1 INJECTION, SOLUTION INTRAMUSCULAR; INTRAVENOUS; SUBCUTANEOUS
Status: DISCONTINUED | OUTPATIENT
Start: 2023-08-18 | End: 2023-08-18 | Stop reason: HOSPADM

## 2023-08-18 RX ORDER — OXYCODONE HCL 5 MG/5 ML
5 SOLUTION, ORAL ORAL
Status: DISCONTINUED | OUTPATIENT
Start: 2023-08-18 | End: 2023-08-18 | Stop reason: HOSPADM

## 2023-08-18 RX ORDER — SODIUM CHLORIDE, SODIUM LACTATE, POTASSIUM CHLORIDE, CALCIUM CHLORIDE 600; 310; 30; 20 MG/100ML; MG/100ML; MG/100ML; MG/100ML
INJECTION, SOLUTION INTRAVENOUS CONTINUOUS
Status: DISCONTINUED | OUTPATIENT
Start: 2023-08-18 | End: 2023-08-18 | Stop reason: HOSPADM

## 2023-08-18 RX ORDER — ONDANSETRON 2 MG/ML
4 INJECTION INTRAMUSCULAR; INTRAVENOUS
Status: DISCONTINUED | OUTPATIENT
Start: 2023-08-18 | End: 2023-08-18 | Stop reason: HOSPADM

## 2023-08-18 RX ORDER — SODIUM CHLORIDE, SODIUM LACTATE, POTASSIUM CHLORIDE, CALCIUM CHLORIDE 600; 310; 30; 20 MG/100ML; MG/100ML; MG/100ML; MG/100ML
INJECTION, SOLUTION INTRAVENOUS CONTINUOUS
Status: DISCONTINUED | OUTPATIENT
Start: 2023-08-18 | End: 2023-08-18

## 2023-08-18 RX ORDER — SODIUM CHLORIDE, SODIUM LACTATE, POTASSIUM CHLORIDE, CALCIUM CHLORIDE 600; 310; 30; 20 MG/100ML; MG/100ML; MG/100ML; MG/100ML
INJECTION, SOLUTION INTRAVENOUS CONTINUOUS
Status: ACTIVE | OUTPATIENT
Start: 2023-08-18 | End: 2023-08-18

## 2023-08-18 RX ORDER — HYDROMORPHONE HYDROCHLORIDE 1 MG/ML
0.4 INJECTION, SOLUTION INTRAMUSCULAR; INTRAVENOUS; SUBCUTANEOUS
Status: DISCONTINUED | OUTPATIENT
Start: 2023-08-18 | End: 2023-08-18 | Stop reason: HOSPADM

## 2023-08-18 RX ORDER — HYDROMORPHONE HYDROCHLORIDE 1 MG/ML
0.2 INJECTION, SOLUTION INTRAMUSCULAR; INTRAVENOUS; SUBCUTANEOUS
Status: DISCONTINUED | OUTPATIENT
Start: 2023-08-18 | End: 2023-08-18 | Stop reason: HOSPADM

## 2023-08-18 RX ORDER — MAGNESIUM SULFATE HEPTAHYDRATE 40 MG/ML
2 INJECTION, SOLUTION INTRAVENOUS ONCE
Status: COMPLETED | OUTPATIENT
Start: 2023-08-18 | End: 2023-08-18

## 2023-08-18 RX ORDER — HYDROMORPHONE HYDROCHLORIDE 2 MG/1
2 TABLET ORAL
Status: DISCONTINUED | OUTPATIENT
Start: 2023-08-18 | End: 2023-08-20 | Stop reason: HOSPADM

## 2023-08-18 RX ORDER — OXYCODONE HCL 5 MG/5 ML
10 SOLUTION, ORAL ORAL
Status: DISCONTINUED | OUTPATIENT
Start: 2023-08-18 | End: 2023-08-18 | Stop reason: HOSPADM

## 2023-08-18 RX ADMIN — OXYCODONE HYDROCHLORIDE 10 MG: 10 TABLET ORAL at 01:15

## 2023-08-18 RX ADMIN — PROPOFOL 50 MG: 10 INJECTION, EMULSION INTRAVENOUS at 13:19

## 2023-08-18 RX ADMIN — DIPHENHYDRAMINE HYDROCHLORIDE 25 MG: 50 INJECTION, SOLUTION INTRAMUSCULAR; INTRAVENOUS at 17:37

## 2023-08-18 RX ADMIN — HYDROMORPHONE HYDROCHLORIDE 1 MG: 1 INJECTION, SOLUTION INTRAMUSCULAR; INTRAVENOUS; SUBCUTANEOUS at 20:16

## 2023-08-18 RX ADMIN — FENTANYL CITRATE 50 MCG: 50 INJECTION, SOLUTION INTRAMUSCULAR; INTRAVENOUS at 14:04

## 2023-08-18 RX ADMIN — PROPOFOL 50 MG: 10 INJECTION, EMULSION INTRAVENOUS at 13:21

## 2023-08-18 RX ADMIN — PANTOPRAZOLE SODIUM 40 MG: 40 INJECTION, POWDER, LYOPHILIZED, FOR SOLUTION INTRAVENOUS at 11:06

## 2023-08-18 RX ADMIN — METRONIDAZOLE 500 MG: 5 INJECTION, SOLUTION INTRAVENOUS at 05:45

## 2023-08-18 RX ADMIN — HYDROMORPHONE HYDROCHLORIDE 1 MG: 1 INJECTION, SOLUTION INTRAMUSCULAR; INTRAVENOUS; SUBCUTANEOUS at 02:17

## 2023-08-18 RX ADMIN — PROPOFOL 50 MG: 10 INJECTION, EMULSION INTRAVENOUS at 13:14

## 2023-08-18 RX ADMIN — DIPHENHYDRAMINE HYDROCHLORIDE 25 MG: 50 INJECTION, SOLUTION INTRAMUSCULAR; INTRAVENOUS at 23:32

## 2023-08-18 RX ADMIN — DIPHENHYDRAMINE HYDROCHLORIDE 25 MG: 50 INJECTION, SOLUTION INTRAMUSCULAR; INTRAVENOUS at 05:49

## 2023-08-18 RX ADMIN — FENTANYL CITRATE 100 MCG: 50 INJECTION, SOLUTION INTRAMUSCULAR; INTRAVENOUS at 13:08

## 2023-08-18 RX ADMIN — SENNOSIDES AND DOCUSATE SODIUM 2 TABLET: 50; 8.6 TABLET ORAL at 17:34

## 2023-08-18 RX ADMIN — OXYCODONE HYDROCHLORIDE 10 MG: 10 TABLET ORAL at 07:39

## 2023-08-18 RX ADMIN — MAGNESIUM SULFATE HEPTAHYDRATE 2 G: 2 INJECTION, SOLUTION INTRAVENOUS at 07:36

## 2023-08-18 RX ADMIN — HYDROMORPHONE HYDROCHLORIDE 2 MG: 2 TABLET ORAL at 23:32

## 2023-08-18 RX ADMIN — HYDROMORPHONE HYDROCHLORIDE 2 MG: 2 TABLET ORAL at 18:01

## 2023-08-18 RX ADMIN — PANTOPRAZOLE SODIUM 40 MG: 40 INJECTION, POWDER, LYOPHILIZED, FOR SOLUTION INTRAVENOUS at 17:33

## 2023-08-18 RX ADMIN — SODIUM CHLORIDE: 9 INJECTION, SOLUTION INTRAVENOUS at 14:53

## 2023-08-18 RX ADMIN — NIFEDIPINE 30 MG: 30 TABLET, EXTENDED RELEASE ORAL at 05:49

## 2023-08-18 RX ADMIN — ONDANSETRON 4 MG: 2 INJECTION INTRAMUSCULAR; INTRAVENOUS at 13:06

## 2023-08-18 RX ADMIN — CEFTRIAXONE SODIUM 2000 MG: 2 INJECTION, POWDER, FOR SOLUTION INTRAMUSCULAR; INTRAVENOUS at 17:40

## 2023-08-18 RX ADMIN — HYDROMORPHONE HYDROCHLORIDE 2 MG: 2 TABLET ORAL at 14:51

## 2023-08-18 RX ADMIN — PROPOFOL 50 MG: 10 INJECTION, EMULSION INTRAVENOUS at 13:11

## 2023-08-18 RX ADMIN — HYDROMORPHONE HYDROCHLORIDE 1 MG: 1 INJECTION, SOLUTION INTRAMUSCULAR; INTRAVENOUS; SUBCUTANEOUS at 09:05

## 2023-08-18 RX ADMIN — METRONIDAZOLE 500 MG: 5 INJECTION, SOLUTION INTRAVENOUS at 18:23

## 2023-08-18 ASSESSMENT — ENCOUNTER SYMPTOMS
COUGH: 0
DIARRHEA: 0
SHORTNESS OF BREATH: 0
WEIGHT LOSS: 1
BACK PAIN: 0
CONSTIPATION: 0
PALPITATIONS: 0
HEADACHES: 0
FEVER: 0
VOMITING: 0
CHILLS: 0
ABDOMINAL PAIN: 1
NAUSEA: 1
DIZZINESS: 0

## 2023-08-18 ASSESSMENT — PAIN DESCRIPTION - PAIN TYPE
TYPE: ACUTE PAIN

## 2023-08-18 ASSESSMENT — PAIN SCALES - GENERAL: PAIN_LEVEL: 0

## 2023-08-18 NOTE — PROGRESS NOTES
Hospital Medicine Daily Progress Note    Date of Service  8/18/2023    Chief Complaint  Megan Nicole is a 42 y.o. female admitted 8/16/2023 with   Chief Complaint   Patient presents with    Abdominal Pain     Started a couple weeks ago, starting to get worst. Generalized abdominal pain, mostly left flank pain.     N/V     Pt states she hasn't really been able to eat b/c of the abdominal pain and the n/v.      Hospital Course  No notes on file    Interval Problem Update  Patient reporting severe pain.  She stated she has had pancreatitis before, she did drink alcohol but drinks maybe 3 times per week, maybe 2-4 glasses of wine during a session. She stated she has been stressed. She has lost about 10lbs unintentionally. She has had ongoing abdominal pains and could only eat soups.   - I reviewed CT A/P at bedside, I showed patient her gastric imaging with a thickened fundus area. She denied any Fhx of gastric cancers. I reviewed images of her pancreatitis.  We discussed ETOH cessation completely as this will trigger future pancreatitis.  - She mentioned she was going to see [facility] specialist, but has not been able to get an appointment yet.  I have reached out to [facility] for evaluation, concerning for gastric abscess or potential neoplasm.  - I will continue IV Ceftriaxone and IV Flagyl for potential abscess.  - I reviewed RUQ US, CBD measured 0.66cm not dilated, final reading pending by radiologist.    8/18:  Patient stated her pain is better controlled with Dilaudid but still ongoing.  - We are pending ERCP today 145  - Keep n.p.o. until then, possibly clear liquid diets afterwards  -Patient mention she has a Fhx mother with mixed connective tissue - sjorgens, lupus and others.    I have discussed this patient's plan of care and discharge plan at IDT rounds today with Case Management, Nursing, Nursing leadership, and other members of the IDT team.    Consultants/Specialty  GI    Code Status  Full Code    Disposition  The  patient is not medically cleared for discharge to home or a post-acute facility.  Anticipate discharge to: home with close outpatient follow-up    I have placed the appropriate orders for post-discharge needs.    Review of Systems  Review of Systems   Constitutional:  Positive for malaise/fatigue and weight loss. Negative for chills and fever.   Respiratory:  Negative for cough and shortness of breath.    Cardiovascular:  Negative for chest pain and palpitations.   Gastrointestinal:  Positive for abdominal pain and nausea. Negative for constipation, diarrhea and vomiting.   Musculoskeletal:  Negative for back pain and joint pain.   Neurological:  Negative for dizziness and headaches.   All other systems reviewed and are negative.       Physical Exam  Temp:  [36.2 °C (97.2 °F)-37.1 °C (98.8 °F)] 36.2 °C (97.2 °F)  Pulse:  [65-96] 80  Resp:  [16-18] 16  BP: ()/(42-87) 100/74  SpO2:  [96 %-100 %] 97 %    Physical Exam  Vitals and nursing note reviewed.   Constitutional:       General: She is in acute distress.      Appearance: She is not ill-appearing.   HENT:      Head: Normocephalic and atraumatic.   Eyes:      General: No scleral icterus.     Extraocular Movements: Extraocular movements intact.   Cardiovascular:      Rate and Rhythm: Normal rate.      Pulses: Normal pulses.      Heart sounds: Normal heart sounds. No murmur heard.  Pulmonary:      Effort: Pulmonary effort is normal. No respiratory distress.      Breath sounds: Normal breath sounds.   Abdominal:      General: Bowel sounds are normal. There is distension.      Palpations: Abdomen is soft.      Tenderness: There is abdominal tenderness.   Musculoskeletal:         General: No swelling or tenderness. Normal range of motion.      Cervical back: Normal range of motion and neck supple.   Skin:     General: Skin is warm.      Capillary Refill: Capillary refill takes less than 2 seconds.      Coloration: Skin is not jaundiced.      Findings: No erythema.    Neurological:      General: No focal deficit present.      Mental Status: She is alert and oriented to person, place, and time. Mental status is at baseline.      Motor: No weakness.   Psychiatric:         Mood and Affect: Mood normal.         Behavior: Behavior normal.         Thought Content: Thought content normal.         Judgment: Judgment normal.         Fluids    Intake/Output Summary (Last 24 hours) at 8/18/2023 1415  Last data filed at 8/18/2023 0800  Gross per 24 hour   Intake 420 ml   Output 1 ml   Net 419 ml       Laboratory  Recent Labs     08/16/23 2014 08/17/23  0027 08/18/23  0119   WBC 8.5 8.1 4.7*   RBC 3.86* 3.43* 3.27*   HEMOGLOBIN 12.0 10.6* 10.2*   HEMATOCRIT 36.4* 32.5* 31.5*   MCV 94.3 94.8 96.3   MCH 31.1 30.9 31.2   MCHC 33.0 32.6 32.4   RDW 41.6 41.8 42.8   PLATELETCT 457* 428 370   MPV 8.2* 8.6* 8.7*     Recent Labs     08/16/23 2014 08/17/23  0027 08/18/23  0119   SODIUM 137 137 136   POTASSIUM 3.3* 3.5* 3.6   CHLORIDE 99 102 106   CO2 22 22 18*   GLUCOSE 94 85 71   BUN 14 12 5*   CREATININE 0.53 0.49* 0.39*   CALCIUM 9.4 8.5 8.4                   Imaging  US-RUQ   Final Result      Uniform gastric wall thickening could reflect gastritis/peptic ulcer disease      Small ascites      4 cm cystic collection adjacent to the caudate corresponds to the smaller of the rim-enhancing fluid collections on comparison CT. This could be a pseudocyst or abscess      Lesser sac pseudocyst or abscess on comparison is not visualized sonographically      Echogenic liver, a nonspecific finding that often is found to represent steatosis.  Other infiltrative processes could have an identical appearance      Left lobe liver simple 19 mm cyst      CT-ABDOMEN-PELVIS WITH   Final Result         1.  Fluid collection tracking along the gastric fundus and greater curvature the stomach. Smaller similar appearing fluid collection is seen near the gastric cardia. Could represent pseudocyst or loculated abdominal  abscess.   2.  Slight hazy peripancreatic fat stranding favoring changes of pancreatitis.   3.  Area of low-density in the pancreatic head, could be related to suspected pancreatitis although pancreatic pseudocyst or other pancreatic mass is not excluded.   4.  Gastric wall thickening, appearance suggesting changes of gastritis otherwise indeterminate, could be related to suspected perigastric abscess or pseudocyst.   5.  Scattered abdominal ascites           Assessment/Plan  * Recurrent acute pancreatitis- (present on admission)  Assessment & Plan  Likely due to alcohol abuse however patient states she did quit drinking again 1 month ago which would be at least 2 weeks prior to onset of symptoms  Because of this, will obtain a right upper quadrant ultrasound for further evaluation however this is most likely alcohol induced  Associated with abdominal abscesses   patient will be started on IV Rocephin and Flagyl  Close monitor for signs of a fungal infection  Multimodal pain management     I reviewed images of her pancreatitis.  We discussed ETOH cessation completely as this will trigger future pancreatitis.  Patient would like to try clear liquid diet, I have changed orders.    8/18: We will continue IV fluids, pending EGD today.  GI following.    Intra-abdominal abscess (HCC)- (present on admission)  Assessment & Plan  Multiple fluid collections noted on CT scan  ERP did discuss the case with general surgery who will follow along, stated no need for drainage at this time, recommended IV antibiotics  Not causing sepsis  Await culture results    Dr. Leonardo notified by EDP.    She stated she has been stressed. She has lost about 10lbs unintentionally. She has had ongoing abdominal pains and could only eat soups.  - She mentioned she was going to see [facility] specialist, but has not been able to get an appointment yet.  I have reached out to GI for evaluation, concerning for gastric abscess or potential neoplasm.  - I  reviewed RUQ US, CBD measured 0.66cm not dilated, final reading pending by radiologist.    8/18:  We are pending ERCP today 1:45PM    Metabolic acidosis  Assessment & Plan  Bicarb level 18  Patient has evidence of starvation ketosis, UA has ketones  - Continue clear liquid diet, patient is having trouble with solid foods and nausea  -I placed consult for nutritionist    Hypomagnesemia  Assessment & Plan  Low serum magnesium levels, 1.6  I am replacing via IV, monitoring closely for hypotension side effect  I am repeating labs in AM    Thrombocytosis- (present on admission)  Assessment & Plan  Mild, likely due to dehydration  plt 428  Continue IVF    Hypokalemia- (present on admission)  Assessment & Plan  K3.6  I am repeating labs in the morning.    HTN (hypertension)- (present on admission)  Assessment & Plan  Continue home nifedipine  Start as needed labetalol  Adjust as needed  -Continue IV fluids, monitor closely after procedure she may become hypotensive         VTE prophylaxis:   SCDs/TEDs   pharmacologic prophylaxis contraindicated due to pending EGD      I have performed a physical exam and reviewed and updated ROS and Plan today (8/18/2023). In review of yesterday's note (8/17/2023), there are no changes except as documented above.      Total time spent 52 minutes.    This included my review of patient's overnight RN notes, face to face interview, physical examination, lab analysis.  Also includes my documented assessments and interventions above.  I spoke with specialist GI.  In addition, I spoke with entire care team on patient's treatment plan and DC planning on IDT rounds.

## 2023-08-18 NOTE — PROGRESS NOTES
1950 pt awake in bed asking for ice chips   2145 pt sleeping on her side in bed with unlabored breathing  2350 pt sleeping with pulse ox on  0150 pt sleeping with pulse ox on  0332 pt sleeping on side with pulse ox on

## 2023-08-18 NOTE — PROGRESS NOTES
Bedside report received from night RN. Assumed care of patient. Alert and oriented X4, wakes easily to voice. On RA, no SOB/respiratory distress noted, Daily plan of care discussed. Pt complains of abdominal pain, medicated per MAR. Denies nausea and vomiting. Call light and personal belongings within reach. Hourly rounding in place.     0910 Pt c/o of abd pain, medicated per MAR.     1059 Pt resting in bed, hospitalist at bedside.    1201 Pt off from floor to pre-op.    1435 Pt back to floor from PACU. VSS    1540 Pt resting in bed, no needs at this time.    1733 Pt resting in bed, due meds given   1801 pt c/o abd pain, medicated per MAR    Report given to NOC RN

## 2023-08-18 NOTE — OR NURSING
1328  To PACU from ENDO via [place]on,  respirations spontaneous and non-labored     1343 resting quietly, responds to verbal stim    1358 awake, up to br with assist-voided    1405 medicated for c/o pain 7-8/10      1420 states pain level better now 5/10, vss, Report to lexi pruitt    1428 transferred to room in stable condition

## 2023-08-18 NOTE — PROGRESS NOTES
2000 - pt sleeping in bed, normal respirations.  2200 - pt sleeping in bed, O2 sats WNL, no needs at this time.  0000 - pt sleeping in bed, vss.  0001 - pt complained of pain, VSS, prn pain meds given by charge nurse  0210 - VSS , pt still complained of pain , prn pain iv meds given per MAR  0400- pt sleeping in bed normal respirations,   0555 - pt awake, given morning meds; cold packs placed.

## 2023-08-18 NOTE — PROGRESS NOTES
GI ATTENDING:    Patient seen and examined.  Chart reviewed.  Esophagogastroduodenoscopy explained at length.    Patient expresses understanding and request to proceed.    Consenting person was given an opportunity to ask questions and discuss other options.  Risks including but not limited to perforation, infection, bleeding, missed lesion(s), cardiac and/or pulmonary event, aspiration, stroke, possible need for surgery and/or interventional radiology, hospitalization possibly prolonged, discomfort, unsuccessful and/or incomplete procedure, indefinite diagnosis, ineffective therapy and/or persistent symptoms, possible need for repeat procedures and/or additional testings, damage to adjacent organs and/or vascular structures, medication reaction, disability, death, and other adverse events possibly life-threatening.  Discussion was undertaken with Layman's terms.  Consenting person stated understanding and acceptance of these risks, and wished to proceed.  Informed consent was given in clear state of mind.

## 2023-08-18 NOTE — CARE PLAN
The patient is Stable - Low risk of patient condition declining or worsening    Shift Goals  Clinical Goals: pain, nausea and itching management  Patient Goals: rest, pain control  Family Goals: na    Progress made toward(s) clinical / shift goals: PRN pain, nausea and itching medications administered per MAR with relief as verbalized by pt.     Patient is not progressing towards the following goals:NA

## 2023-08-18 NOTE — CARE PLAN
The patient is Stable - Low risk of patient condition declining or worsening    Shift Goals  Clinical Goals: Pt's pan will be tolerable at 3/10 at end of shift; maintain NPO at MN w/ sips meds.  Patient Goals: sleep and pain mgt  Family Goals: na    Progress made toward(s) clinical / shift goals:      Pt's pain was tolerable at end of shift after giving prn pain meds thrice and cold packs; maintained on NPO at mn w/ sips w/ meds. Continued on IV abx and IV fluids.    Patient is not progressing towards the following goals:

## 2023-08-18 NOTE — DISCHARGE PLANNING
Case Management Discharge Planning    Admission Date: 8/16/2023  GMLOS: 5.2  ALOS: 2    6-Clicks ADL Score: 24  6-Clicks Mobility Score: 23      Anticipated Discharge Dispo: Discharge Disposition: Discharged to home/self care (01)    DME Needed: No    Action(s) Taken: Updated Provider/Nurse on Discharge Plan    Patient discussed during morning IDT rounds with team. Patient is not medically cleared for discharge at this time.  SWCM will remain available and continue to assist with safe disposition.     Escalations Completed: None    Medically Clear: No    Next Steps: pending medical clearance    Barriers to Discharge: Medical clearance    Is the patient up for discharge tomorrow: No

## 2023-08-18 NOTE — PROGRESS NOTES
Received report from dayshift RN. Pt resting in bed, awake. Pt A&O x4, on RA. Pt complains pain , medicated per MAr. Pt continued on IV fluids. Pt updated with POC which includes IV abx, pain mgt and NPO at MN. Call light within reach, fall precautions in place, all needs met at this time.

## 2023-08-18 NOTE — ANESTHESIA TIME REPORT
Anesthesia Start and Stop Event Times     Date Time Event    8/18/2023 1304 Anesthesia Start     1329 Anesthesia Stop        Responsible Staff  08/18/23    Name Role Begin End    Pablito Liao M.D. Anesth 1304 1329        Overtime Reason:  no overtime (within assigned shift)    Comments:

## 2023-08-18 NOTE — OR SURGEON
Esophagogastroduodenoscopy operative note    PreOp Diagnosis: Abdominal pain, abnormal CT with gastric wall thickening and extrinsic mass      PostOp Diagnosis: Same      Procedure(s):  GASTROSCOPY - Wound Class: None    Surgeon(s):  Yahir Michael M.D.    Anesthesiologist/Type of Anesthesia:  Anesthesiologist: Pablito Liao M.D./General    Surgical Staff:  Circulator: Audi Wallace R.N.  Endoscopy Technician: Dotty Pisano    Specimens removed if any:  ID Type Source Tests Collected by Time Destination   A : Biopsy, Duodenal Other Other PATHOLOGY SPECIMEN Yahir Michael M.D. 8/18/2023  1:14 PM    B : Biopsy, Antrum Other Other PATHOLOGY SPECIMEN Yahir Michael M.D. 8/18/2023  1:14 PM    C : Biopsy, Gastric body mass Other Other PATHOLOGY SPECIMEN Yahir Michael M.D. 8/18/2023  1:18 PM        Dr. Michael  [facility]  [phone]    EGD with: Biopsy    Indication: Abdominal pain, abnormal CT with gastric wall thickening and extrinsic mass    Sedation: MAC    Findings:    Esophagus     Unremarkable mucosa    Stomach     Mass       Consistent with extrinsic compression       Decreasing gastric volume by two thirds       Posterior greater curvature of stomach       At least 7 cm with x 12 cm length       Erythematous, smooth overlying mucosa biopsied     Antrum       Mild erythema, edema and mosaic mucosal pattern       Biopsy to rule out H. pylori    Duodenum     Unremarkable mucosa biopsied for celiac sprue    Plan:  Follow-up pathology    Supportive care for pancreatitis    Repeat imaging in 4 to 6 weeks to evaluate for regression or progression    After 6 weeks if symptomatic and cyst remains then would consider endoscopic ultrasound with cyst gastrostomy    Broad-spectrum antibiotics if any signs of infection      Procedure detail:    Prior to procedure, informed consent was obtained.  Risks, benefits, alternatives, including but not limited to risk of  bleeding, infection, perforation, adverse reaction to sedating medicine, failure to identify pathology and death were explained to the patient who accepted all risks.    Patient was prepped in the left lateral position IV propofol sedation was provided by anesthesia.    Scope tip of the Olympus flexible gastroscope was passed in the proximal esophagus.  Proximal, middle and distal thirds of the esophagus were well visualized and were unremarkable.  The stomach was entered and gastric pool was suctioned and air was insufflated.  Throughout the entire body and extending into the antrum there was a very large submucosal mass.  This took up at least two thirds of the gastric lumen.  The overlying mucosa was erythematous but smooth.  The surrounding gastric mucosa was unremarkable.  The mass was located at the posterior greater curvature and was long and sausage shaped extending to the antrum.  It was estimated to be at least 12 cm in length and at least 7 cm in width.  Biopsies were taken and there was pliability to the surface.  The scope was advanced to the antrum where there was mild erythema and edema.  There was a mosaic mucosal pattern.  Biopsies taken for H. pylori.  Retroflex in the antrum and body once again demonstrated the extrinsic compression.  There was a 2 cm hiatal hernia appreciated.  The scope was straightened and the pylorus was entered the duodenal bulb, sweep second and third portion were unremarkable.  Biopsies were taken for celiac sprue.  The biliary ampulla seemed somewhat edematous on tangential viewing.  After multiple passes with the same findings the procedure was deemed complete.  Air and liquid were suctioned.  The scope was withdrawn.  Patient tolerated the procedure well and was sent to recovery without immediate complications.      8/18/2023 1:24 PM Yahir Michael M.D.

## 2023-08-18 NOTE — OR NURSING
1230   Pt allergies and NPO status verified.  Home medications reviewed.  Pt verbalizes understanding of pain scale, expected course of stay, and plan of care.  Surgical site verified with pt.  IV access confirmed, LR infusion initiated.  All questions answered.  Bed in low position.  Call light in reach.

## 2023-08-18 NOTE — ANESTHESIA PREPROCEDURE EVALUATION
Case: [id number] Date/Time: 08/18/23 1345    Procedure: GASTROSCOPY (Abdomen)    Anesthesia type: General    Pre-op diagnosis: EGD    Location:  ENDOSCOPIC ULTRASOUND ROOM / SURGERY [facility]    Surgeons: Yahir Michael M.D.          Relevant Problems   CARDIAC   (positive) HTN (hypertension)       Physical Exam    Airway   Mallampati: II  TM distance: >3 FB  Neck ROM: full       Cardiovascular - normal exam  Rhythm: regular  Rate: normal  (-) murmur     Dental - normal exam           Pulmonary - normal exam  Breath sounds clear to auscultation     Abdominal    Neurological - normal exam                 Anesthesia Plan    ASA 2       Plan - MAC               Induction: intravenous    Postoperative Plan: Postoperative administration of opioids is intended.    Pertinent diagnostic labs and testing reviewed    Informed Consent:    Anesthetic plan and risks discussed with patient.    Use of blood products discussed with: patient whom consented to blood products.

## 2023-08-18 NOTE — ANESTHESIA POSTPROCEDURE EVALUATION
Patient: Megan Nicole    Procedure Summary     Date: 08/18/23 Room / Location:  ENDOSCOPIC ULTRASOUND ROOM / SURGERY [facility] ER    Anesthesia Start: 1304 Anesthesia Stop: 1329    Procedure: GASTROSCOPY (Abdomen) Diagnosis: (pending lab results)    Surgeons: Yahir Michael M.D. Responsible Provider: Pablito Liao M.D.    Anesthesia Type: MAC ASA Status: 2          Final Anesthesia Type: MAC  Last vitals  BP   Blood Pressure: 112/78    Temp   37.1 °C (98.8 °F)    Pulse   87   Resp   17    SpO2   98 %      Anesthesia Post Evaluation    Patient location during evaluation: PACU  Patient participation: complete - patient participated  Level of consciousness: awake and alert  Pain score: 0    Airway patency: patent  Anesthetic complications: no  Cardiovascular status: hemodynamically stable  Respiratory status: acceptable  Hydration status: euvolemic    PONV: none          No notable events documented.     Nurse Pain Score: 8 (NPRS)

## 2023-08-18 NOTE — CONSULTS
Gastroenterology Initial Consult Note               Author:  Yahir Michael M.D. with HUA Martins Date & Time Created: 8/17/2023 5:09 PM       Patient ID:  Name:             Megan Nicole    YOB: 1980  Age:                 42 y.o.  female  MRN:               [id number]      Referring Provider:  Solomon Lopez MD      Presenting Chief Complaint:  Abdominal pain, Pancreatitis, Abnormal Imaging      History of Present Illness:    This is a very pleasant 42 y.o. female with the past medical history includes hypertension.  She is seen in consultation for abdominal pain, pancreatitis, abnormal imaging.  Patient presented to the hospital on 8/16/2023 with complaints of upper abdominal pain, nausea, and vomiting.  The patient states that she has had ongoing waxing and waning upper abdominal pain over the last month or so, but over the last week it has become significantly worse.  She developed nausea and vomiting over the last 2 days prompting her to come to the emergency room.  Admission noted to have normal AST, ALT, T. bili.  However, lipase elevated 209.  CBC with globin 10.6, hematocrit 32.5 cytosis.  She denies fevers or chills.  CT abdomen pelvis with contrast demonstrates fluid collection tracking along the gastric fundus and greater curvature of the stomach with smaller similar-appearing fluid collections near the cardia, slight hazy peripancreatic fat stranding, area of low-density in the pancreatic head, and gastric wall thickening as well as scattered abdominal ascites.    The patient reports a significant alcohol history.  She states over the last several months she has been drinking 3 to 4 glasses of wine 4 times a week.  She reports increased stress at her job as a potential contributing factor.  She reports a history of 1 episode of pancreatitis 5 years ago without complications.  She reports loss over the last month and anorexia.      Review of Systems:  Review of Systems    Constitutional:  Positive for weight loss. Negative for chills and fever.   HENT: Negative.     Eyes: Negative.    Respiratory: Negative.     Cardiovascular: Negative.    Gastrointestinal:  Positive for abdominal pain, nausea and vomiting. Negative for blood in stool and melena.   Genitourinary: Negative.    Musculoskeletal: Negative.    Neurological:  Negative for dizziness and headaches.   Psychiatric/Behavioral:  Negative for memory loss. The patient is nervous/anxious.              Past Medical History:  Past Medical History:   Diagnosis Date    Hypertension      Active Hospital Problems    Diagnosis     Intra-abdominal abscess (HCC) [K65.1]     Hypokalemia [E87.6]     Thrombocytosis [D75.839]     Recurrent acute pancreatitis [K85.90]     HTN (hypertension) [I10]          Past Surgical History:  History reviewed. No pertinent surgical history.        Hospital Medications:  Current Facility-Administered Medications   Medication Dose Frequency Provider Last Rate Last Admin    HYDROmorphone (Dilaudid) injection 1 mg  1 mg Q3HRS PRN Solomon Lopez M.D.        NIFEdipine SR (Procadia-XL) tablet 30 mg  30 mg DAILY FER QuinonezOOg   30 mg at 08/17/23 0515    senna-docusate (Pericolace Or Senokot S) 8.6-50 MG per tablet 2 Tablet  2 Tablet BID FER QuinonezO.   2 Tablet at 08/17/23 0002    And    polyethylene glycol/lytes (Miralax) PACKET 1 Packet  1 Packet QDAY PRN Mauricio Avalos D.O.        And    magnesium hydroxide (Milk Of Magnesia) suspension 30 mL  30 mL QDAY PRN Mauricio Avalos D.O.        And    bisacodyl (Dulcolax) suppository 10 mg  10 mg QDAY PRN Mauricio Avalos D.O.        NS infusion   Continuous Mauricio Avalos D.O. 150 mL/hr at 08/17/23 1127 New Bag at 08/17/23 1127    Pharmacy Consult Request ...Pain Management Review 1 Each  1 Each PHARMACY TO DOSE Mauricio Avalos D.O.        oxyCODONE immediate-release (Roxicodone) tablet 5 mg  5 mg Q3HRS PRN Mauricio Avalos D.O.   5 mg at  23 0012    Or    oxyCODONE immediate release (Roxicodone) tablet 10 mg  10 mg Q3HRS PRN FER QuinonezO.   10 mg at 23 1255    labetalol (Normodyne/Trandate) injection 10 mg  10 mg Q4HRS PRN FER QuinonezO.        ondansetron (Zofran) syringe/vial injection 4 mg  4 mg Q4HRS PRN Mauricio Avalos D.O.        ondansetron (Zofran ODT) dispertab 4 mg  4 mg Q4HRS PRN FER QuinonezO.   4 mg at 23 0748    promethazine (Phenergan) tablet 12.5-25 mg  12.5-25 mg Q4HRS PRN FER QuinonezO.        promethazine (Phenergan) suppository 12.5-25 mg  12.5-25 mg Q4HRS PRN Mauricio Avalos D.O.        prochlorperazine (Compazine) injection 5-10 mg  5-10 mg Q4HRS PRN FER QuinonezO.        cefTRIAXone (Rocephin) 2,000 mg in  mL IVPB  2,000 mg Q24HRS Mauricio Avalos D.O.        metroNIDAZOLE (Flagyl) IVPB 500 mg  500 mg Q12HRS FER QuinonezO.   Stopped at 23 0613    diphenhydrAMINE (Benadryl) injection 25 mg  25 mg Q6HRS PRN CURTIS Quinonez.O.   25 mg at 23 0748   Last reviewed on 2023  9:51 PM by Antonette Crockett, PharmD       Current Outpatient Medications:  Medications Prior to Admission   Medication Sig Dispense Refill Last Dose    NIFEdipine SR (PROCARDIA-XL) 30 MG tablet Take 30 mg by mouth every day. Indications: High Blood Pressure Disorder   8/15/2023    Prenatal Multivit-Min-Fe-FA (PRE-DMITRI FORMULA PO) Take 1 Tablet by mouth every day.   UNK         Medication Allergies:  Allergies   Allergen Reactions    Sulfa Drugs Hives and Rash     rash         Family Medical History:  No family history on file.      Social History:  Social History     Socioeconomic History    Marital status: Single     Spouse name: Not on file    Number of children: Not on file    Years of education: Not on file    Highest education level: Not on file   Occupational History    Not on file   Tobacco Use    Smoking status: Never    Smokeless tobacco: Never   Vaping Use    Vaping Use:  "Never used   Substance and Sexual Activity    Alcohol use: Not Currently    Drug use: Never    Sexual activity: Not on file   Other Topics Concern    Not on file   Social History Narrative    Not on file     Social Determinants of Health     Financial Resource Strain: Not on file   Food Insecurity: Not on file   Transportation Needs: Not on file   Physical Activity: Not on file   Stress: Not on file   Social Connections: Not on file   Intimate Partner Violence: Not on file   Housing Stability: Not on file         Vital signs:  Weight/BMI: Body mass index is 20.11 kg/m².  /87   Pulse 86   Temp 36.7 °C (98 °F) (Temporal)   Resp 18   Ht 1.676 m (5' 5.98\")   Wt 56.5 kg (124 lb 9 oz)   SpO2 97%   Vitals:    08/17/23 0450 08/17/23 1036 08/17/23 1428 08/17/23 1635   BP: 115/74 109/77 109/68 122/87   Pulse: 74 74 85 86   Resp: 18 18 18 18   Temp: 36.9 °C (98.5 °F) 36.9 °C (98.5 °F)  36.7 °C (98 °F)   TempSrc: Temporal Temporal  Temporal   SpO2: 98% 97% 96% 97%   Weight:       Height:         Oxygen Therapy:  Pulse Oximetry: 97 %, O2 (LPM): 0, O2 Delivery Device: None - Room Air    Intake/Output Summary (Last 24 hours) at 8/17/2023 1709  Last data filed at 8/17/2023 1428  Gross per 24 hour   Intake 270 ml   Output 400 ml   Net -130 ml         Physical Exam:  Physical Exam  Vitals and nursing note reviewed.   Constitutional:       Appearance: Normal appearance.   HENT:      Head: Normocephalic and atraumatic.      Right Ear: External ear normal.      Left Ear: External ear normal.      Nose: Nose normal. No congestion.      Mouth/Throat:      Mouth: Mucous membranes are moist.      Pharynx: Oropharynx is clear.   Eyes:      General: No scleral icterus.     Extraocular Movements: Extraocular movements intact.      Pupils: Pupils are equal, round, and reactive to light.   Cardiovascular:      Rate and Rhythm: Normal rate and regular rhythm.      Pulses: Normal pulses.      Heart sounds: Normal heart sounds. No " murmur heard.  Pulmonary:      Effort: Pulmonary effort is normal. No respiratory distress.      Breath sounds: Normal breath sounds.   Abdominal:      General: Abdomen is flat. Bowel sounds are normal.      Palpations: Abdomen is soft.      Tenderness: There is abdominal tenderness (Epigastric and LUQ).   Musculoskeletal:      Cervical back: Neck supple.      Right lower leg: No edema.      Left lower leg: No edema.   Lymphadenopathy:      Cervical: No cervical adenopathy.   Skin:     General: Skin is warm and dry.   Neurological:      General: No focal deficit present.      Mental Status: She is alert and oriented to person, place, and time.   Psychiatric:         Thought Content: Thought content normal.         Judgment: Judgment normal.           Labs:  Recent Labs     08/16/23 2014 08/17/23  0027   SODIUM 137 137   POTASSIUM 3.3* 3.5*   CHLORIDE 99 102   CO2 22 22   BUN 14 12   CREATININE 0.53 0.49*   CALCIUM 9.4 8.5     Recent Labs     08/16/23 2014 08/17/23  0027   ALTSGPT 9 9   ASTSGOT 20 16   ALKPHOSPHAT 101* 84   TBILIRUBIN 0.4 0.3   LIPASE 209*  --    GLUCOSE 94 85     Recent Labs     08/16/23 2014 08/17/23  0027   WBC 8.5 8.1   NEUTSPOLYS 82.20*  --    LYMPHOCYTES 13.10*  --    MONOCYTES 3.80  --    EOSINOPHILS 0.40  --    BASOPHILS 0.10  --    ASTSGOT 20 16   ALTSGPT 9 9   ALKPHOSPHAT 101* 84   TBILIRUBIN 0.4 0.3     Recent Labs     08/16/23 2014 08/17/23  0027   RBC 3.86* 3.43*   HEMOGLOBIN 12.0 10.6*   HEMATOCRIT 36.4* 32.5*   PLATELETCT 457* 428     Recent Results (from the past 24 hour(s))   URINALYSIS,CULTURE IF INDICATED    Collection Time: 08/16/23  7:17 PM    Specimen: Urine   Result Value Ref Range    Color Yellow     Character Cloudy (A)     Specific Gravity >=1.030 <1.035    Ph 6.5 5.0 - 8.0    Glucose Negative Negative mg/dL    Ketones >=80 (A) Negative mg/dL    Protein 30 (A) Negative mg/dL    Bilirubin Moderate (A) Negative    Nitrite Negative Negative    Leukocyte Esterase Negative  Negative    Occult Blood Negative Negative    Micro Urine Req Microscopic    URINE MICROSCOPIC (W/UA)    Collection Time: 08/16/23  7:17 PM   Result Value Ref Range    WBC 0-2 /hpf    RBC 0-2 /hpf    Bacteria Few (A) None /hpf    Epithelial Cells Few Few /hpf   HCG QUAL SERUM    Collection Time: 08/16/23  7:52 PM   Result Value Ref Range    Beta-Hcg Qualitative Serum Negative Negative   CBC WITH DIFFERENTIAL    Collection Time: 08/16/23  8:14 PM   Result Value Ref Range    WBC 8.5 4.8 - 10.8 K/uL    RBC 3.86 (L) 4.20 - 5.40 M/uL    Hemoglobin 12.0 12.0 - 16.0 g/dL    Hematocrit 36.4 (L) 37.0 - 47.0 %    MCV 94.3 81.4 - 97.8 fL    MCH 31.1 27.0 - 33.0 pg    MCHC 33.0 32.2 - 35.5 g/dL    RDW 41.6 35.9 - 50.0 fL    Platelet Count 457 (H) 164 - 446 K/uL    MPV 8.2 (L) 9.0 - 12.9 fL    Neutrophils-Polys 82.20 (H) 44.00 - 72.00 %    Lymphocytes 13.10 (L) 22.00 - 41.00 %    Monocytes 3.80 0.00 - 13.40 %    Eosinophils 0.40 0.00 - 6.90 %    Basophils 0.10 0.00 - 1.80 %    Immature Granulocytes 0.40 0.00 - 0.90 %    Nucleated RBC 0.00 0.00 - 0.20 /100 WBC    Neutrophils (Absolute) 6.95 1.82 - 7.42 K/uL    Lymphs (Absolute) 1.11 1.00 - 4.80 K/uL    Monos (Absolute) 0.32 0.00 - 0.85 K/uL    Eos (Absolute) 0.03 0.00 - 0.51 K/uL    Baso (Absolute) 0.01 0.00 - 0.12 K/uL    Immature Granulocytes (abs) 0.03 0.00 - 0.11 K/uL    NRBC (Absolute) 0.00 K/uL   COMP METABOLIC PANEL    Collection Time: 08/16/23  8:14 PM   Result Value Ref Range    Sodium 137 135 - 145 mmol/L    Potassium 3.3 (L) 3.6 - 5.5 mmol/L    Chloride 99 96 - 112 mmol/L    Co2 22 20 - 33 mmol/L    Anion Gap 16.0 7.0 - 16.0    Glucose 94 65 - 99 mg/dL    Bun 14 8 - 22 mg/dL    Creatinine 0.53 0.50 - 1.40 mg/dL    Calcium 9.4 8.4 - 10.2 mg/dL    Correct Calcium 9.6 8.5 - 10.5 mg/dL    AST(SGOT) 20 12 - 45 U/L    ALT(SGPT) 9 2 - 50 U/L    Alkaline Phosphatase 101 (H) 30 - 99 U/L    Total Bilirubin 0.4 0.1 - 1.5 mg/dL    Albumin 3.8 3.2 - 4.9 g/dL    Total Protein 7.8  6.0 - 8.2 g/dL    Globulin 4.0 (H) 1.9 - 3.5 g/dL    A-G Ratio 1.0 g/dL   LIPASE    Collection Time: 08/16/23  8:14 PM   Result Value Ref Range    Lipase 209 (H) 11 - 82 U/L   ESTIMATED GFR    Collection Time: 08/16/23  8:14 PM   Result Value Ref Range    GFR (CKD-EPI) 119 >60 mL/min/1.73 m 2   CBC without Differential    Collection Time: 08/17/23 12:27 AM   Result Value Ref Range    WBC 8.1 4.8 - 10.8 K/uL    RBC 3.43 (L) 4.20 - 5.40 M/uL    Hemoglobin 10.6 (L) 12.0 - 16.0 g/dL    Hematocrit 32.5 (L) 37.0 - 47.0 %    MCV 94.8 81.4 - 97.8 fL    MCH 30.9 27.0 - 33.0 pg    MCHC 32.6 32.2 - 35.5 g/dL    RDW 41.8 35.9 - 50.0 fL    Platelet Count 428 164 - 446 K/uL    MPV 8.6 (L) 9.0 - 12.9 fL   Comp Metabolic Panel (CMP)    Collection Time: 08/17/23 12:27 AM   Result Value Ref Range    Sodium 137 135 - 145 mmol/L    Potassium 3.5 (L) 3.6 - 5.5 mmol/L    Chloride 102 96 - 112 mmol/L    Co2 22 20 - 33 mmol/L    Anion Gap 13.0 7.0 - 16.0    Glucose 85 65 - 99 mg/dL    Bun 12 8 - 22 mg/dL    Creatinine 0.49 (L) 0.50 - 1.40 mg/dL    Calcium 8.5 8.4 - 10.2 mg/dL    Correct Calcium 9.2 8.5 - 10.5 mg/dL    AST(SGOT) 16 12 - 45 U/L    ALT(SGPT) 9 2 - 50 U/L    Alkaline Phosphatase 84 30 - 99 U/L    Total Bilirubin 0.3 0.1 - 1.5 mg/dL    Albumin 3.1 (L) 3.2 - 4.9 g/dL    Total Protein 6.9 6.0 - 8.2 g/dL    Globulin 3.8 (H) 1.9 - 3.5 g/dL    A-G Ratio 0.8 g/dL   ESTIMATED GFR    Collection Time: 08/17/23 12:27 AM   Result Value Ref Range    GFR (CKD-EPI) 122 >60 mL/min/1.73 m 2         Radiology Review:  US-RUQ   Final Result      Uniform gastric wall thickening could reflect gastritis/peptic ulcer disease      Small ascites      4 cm cystic collection adjacent to the caudate corresponds to the smaller of the rim-enhancing fluid collections on comparison CT. This could be a pseudocyst or abscess      Lesser sac pseudocyst or abscess on comparison is not visualized sonographically      Echogenic liver, a nonspecific finding that  often is found to represent steatosis.  Other infiltrative processes could have an identical appearance      Left lobe liver simple 19 mm cyst      CT-ABDOMEN-PELVIS WITH   Final Result         1.  Fluid collection tracking along the gastric fundus and greater curvature the stomach. Smaller similar appearing fluid collection is seen near the gastric cardia. Could represent pseudocyst or loculated abdominal abscess.   2.  Slight hazy peripancreatic fat stranding favoring changes of pancreatitis.   3.  Area of low-density in the pancreatic head, could be related to suspected pancreatitis although pancreatic pseudocyst or other pancreatic mass is not excluded.   4.  Gastric wall thickening, appearance suggesting changes of gastritis otherwise indeterminate, could be related to suspected perigastric abscess or pseudocyst.   5.  Scattered abdominal ascites            MDM (Data Review):   -Records reviewed and summarized in current documentation  -I personally reviewed and interpreted the laboratory results  -I personally reviewed the radiology images        Medical Decision Making, by Problem:  Active Hospital Problems    Diagnosis     Intra-abdominal abscess (HCC) [K65.1]     Hypokalemia [E87.6]     Thrombocytosis [D75.839]     Recurrent acute pancreatitis [K85.90]     HTN (hypertension) [I10]            Assessment/Recommendations:  42-year-old female abdominal pain, nausea, and vomiting.  He has elevated lipase and CT imaging suggestive of acute pancreatitis with adjacent edema and fluid along the stomach as well as scattered abdominal ascites suspect related to severe pancreatitis and possibly forming pseudocysts.  Abscesses are in the imaging differential, however patient without fever or leukocytosis making this less likely.  She does have gastric wall thickening concerning for an intraluminal mass or inflammation.  This could be related to the significant inflammatory process of the  pancreas.    Assessment:  -Epigastric and left upper quadrant abdominal pain  -Nausea and vomiting  -Acute pancreatitis adjacent edema and fluid collections suspect secondary to alcohol use  -Abnormal imaging of the gastric wall  -Anorexia  -Weight loss    Plan:  -Continue diet, NPO after midnight  -EGD with possible biopsies tomorrow at 2 PM with Dr. Michael. Patient seen and examined before proceeding.    Risks, benefits, and alternatives of aforementioned procedures were discussed with patient.  Consenting patient was given opportunities to ask questions and discuss other options.  Risks including but not limited to perforation, infection, bleeding, missed lesion(s), possible need for surgery(ies) and/or interventional radiology, possible need for repeat procedure(s) and/or additional testing, hospitalization possibly prolonged, cardiac and/or pulmonary event, aspiration, hypoxia, stroke, medication and/or anesthesia reaction, indefinite diagnosis, discomfort, unsuccessful and/or incomplete procedure, ineffective therapy and/or persistent symptoms, damage to adjacent organs and/or vascular structures, and other adverse events possibly life-threatening.  Interactive discussion was undertaken with Layman's terms. I answered questions in full and to satisfaction.  Consenting person(s) stated understanding and acceptance of these risks, and wished to proceed.  Informed consent was given in clear state of mind.    -MARGARITA, IgG4  -She may benefit from repeat imaging and EUS in the future, however would need the acute inflammation get an accurate evaluation.    GI Attending -    Patient seen, examined, chart reviewed and case discussed and plan arrived at with A.P.R.N.  Agree with this note.      Yahir Michael M.D.    Graham Rodriguez, A.P.R.N.      Thank you for inviting me to participate in the care of this patient. Please do not hesitate to call GI consultants with additional questions/concerns or changes in the  patient's clinical status at [phone].      Core Quality Measures   Reviewed items:  Labs, Medications and Radiology reports reviewed

## 2023-08-18 NOTE — PROGRESS NOTES
Hourly rounding complete updated the board  Hourly rounding complete pt is sleeping  Hourly rounding complete pt is sleeping chest is rising up and down  Hourly rounding complete vitals done  Hourly rounding complete nurse is in the room  Hourly rounding complete pt just arrived back to the room  Hourly rounding complete pt is resting  Hourly rounding complete pt is sleeping

## 2023-08-18 NOTE — ASSESSMENT & PLAN NOTE
Bicarb level 18  Patient has evidence of starvation ketosis, UA has ketones  - Continue clear liquid diet, patient is having trouble with solid foods and nausea  -I placed consult for nutritionist  -I am repeating labs in the morning

## 2023-08-19 ENCOUNTER — APPOINTMENT (OUTPATIENT)
Dept: RADIOLOGY | Facility: MEDICAL CENTER | Age: 43
DRG: 371 | End: 2023-08-19
Attending: NURSE PRACTITIONER
Payer: COMMERCIAL

## 2023-08-19 PROBLEM — K31.89: Status: ACTIVE | Noted: 2023-08-17

## 2023-08-19 PROBLEM — R10.9 INTRACTABLE ABDOMINAL PAIN: Status: ACTIVE | Noted: 2023-08-19

## 2023-08-19 LAB — NUCLEAR IGG SER QL IA: NORMAL

## 2023-08-19 PROCEDURE — 700105 HCHG RX REV CODE 258: Performed by: INTERNAL MEDICINE

## 2023-08-19 PROCEDURE — 99233 SBSQ HOSP IP/OBS HIGH 50: CPT | Performed by: INTERNAL MEDICINE

## 2023-08-19 PROCEDURE — 700102 HCHG RX REV CODE 250 W/ 637 OVERRIDE(OP): Performed by: NURSE PRACTITIONER

## 2023-08-19 PROCEDURE — 94760 N-INVAS EAR/PLS OXIMETRY 1: CPT

## 2023-08-19 PROCEDURE — 700102 HCHG RX REV CODE 250 W/ 637 OVERRIDE(OP): Performed by: INTERNAL MEDICINE

## 2023-08-19 PROCEDURE — A9270 NON-COVERED ITEM OR SERVICE: HCPCS | Performed by: NURSE PRACTITIONER

## 2023-08-19 PROCEDURE — C9113 INJ PANTOPRAZOLE SODIUM, VIA: HCPCS | Performed by: INTERNAL MEDICINE

## 2023-08-19 PROCEDURE — 770001 HCHG ROOM/CARE - MED/SURG/GYN PRIV*

## 2023-08-19 PROCEDURE — A9270 NON-COVERED ITEM OR SERVICE: HCPCS | Performed by: INTERNAL MEDICINE

## 2023-08-19 PROCEDURE — 700111 HCHG RX REV CODE 636 W/ 250 OVERRIDE (IP): Mod: JZ | Performed by: INTERNAL MEDICINE

## 2023-08-19 PROCEDURE — 74018 RADEX ABDOMEN 1 VIEW: CPT

## 2023-08-19 RX ORDER — POLYETHYLENE GLYCOL 3350 17 G/17G
1 POWDER, FOR SOLUTION ORAL 2 TIMES DAILY
Status: DISCONTINUED | OUTPATIENT
Start: 2023-08-19 | End: 2023-08-20 | Stop reason: HOSPADM

## 2023-08-19 RX ORDER — LIDOCAINE HYDROCHLORIDE 20 MG/ML
15 SOLUTION OROPHARYNGEAL
Status: DISCONTINUED | OUTPATIENT
Start: 2023-08-19 | End: 2023-08-20 | Stop reason: HOSPADM

## 2023-08-19 RX ADMIN — POLYETHYLENE GLYCOL 3350 1 PACKET: 17 POWDER, FOR SOLUTION ORAL at 19:48

## 2023-08-19 RX ADMIN — DIPHENHYDRAMINE HYDROCHLORIDE 25 MG: 50 INJECTION, SOLUTION INTRAMUSCULAR; INTRAVENOUS at 22:58

## 2023-08-19 RX ADMIN — PANTOPRAZOLE SODIUM 40 MG: 40 INJECTION, POWDER, LYOPHILIZED, FOR SOLUTION INTRAVENOUS at 19:48

## 2023-08-19 RX ADMIN — POLYETHYLENE GLYCOL 3350 1 PACKET: 17 POWDER, FOR SOLUTION ORAL at 12:47

## 2023-08-19 RX ADMIN — METRONIDAZOLE 500 MG: 5 INJECTION, SOLUTION INTRAVENOUS at 16:49

## 2023-08-19 RX ADMIN — HYDROMORPHONE HYDROCHLORIDE 4 MG: 2 TABLET ORAL at 11:38

## 2023-08-19 RX ADMIN — HYDROMORPHONE HYDROCHLORIDE 2 MG: 2 TABLET ORAL at 04:12

## 2023-08-19 RX ADMIN — SODIUM CHLORIDE 1000 ML: 9 INJECTION, SOLUTION INTRAVENOUS at 12:48

## 2023-08-19 RX ADMIN — CEFTRIAXONE SODIUM 2000 MG: 2 INJECTION, POWDER, FOR SOLUTION INTRAMUSCULAR; INTRAVENOUS at 16:24

## 2023-08-19 RX ADMIN — SODIUM CHLORIDE: 9 INJECTION, SOLUTION INTRAVENOUS at 02:38

## 2023-08-19 RX ADMIN — HYDROMORPHONE HYDROCHLORIDE 2 MG: 2 TABLET ORAL at 08:35

## 2023-08-19 RX ADMIN — DIPHENHYDRAMINE HYDROCHLORIDE 25 MG: 50 INJECTION, SOLUTION INTRAMUSCULAR; INTRAVENOUS at 08:36

## 2023-08-19 RX ADMIN — HYDROMORPHONE HYDROCHLORIDE 2 MG: 2 TABLET ORAL at 23:29

## 2023-08-19 RX ADMIN — LIDOCAINE HYDROCHLORIDE 15 ML: 20 SOLUTION OROPHARYNGEAL at 16:47

## 2023-08-19 RX ADMIN — SENNOSIDES AND DOCUSATE SODIUM 2 TABLET: 50; 8.6 TABLET ORAL at 19:48

## 2023-08-19 RX ADMIN — HYDROMORPHONE HYDROCHLORIDE 2 MG: 2 TABLET ORAL at 19:48

## 2023-08-19 RX ADMIN — DIPHENHYDRAMINE HYDROCHLORIDE 25 MG: 50 INJECTION, SOLUTION INTRAMUSCULAR; INTRAVENOUS at 16:22

## 2023-08-19 RX ADMIN — SODIUM CHLORIDE: 9 INJECTION, SOLUTION INTRAVENOUS at 23:32

## 2023-08-19 RX ADMIN — HYDROMORPHONE HYDROCHLORIDE 1 MG: 1 INJECTION, SOLUTION INTRAMUSCULAR; INTRAVENOUS; SUBCUTANEOUS at 05:23

## 2023-08-19 RX ADMIN — METRONIDAZOLE 500 MG: 5 INJECTION, SOLUTION INTRAVENOUS at 05:25

## 2023-08-19 RX ADMIN — HYDROMORPHONE HYDROCHLORIDE 4 MG: 2 TABLET ORAL at 16:48

## 2023-08-19 RX ADMIN — PANTOPRAZOLE SODIUM 40 MG: 40 INJECTION, POWDER, LYOPHILIZED, FOR SOLUTION INTRAVENOUS at 05:23

## 2023-08-19 RX ADMIN — HYDROMORPHONE HYDROCHLORIDE 1 MG: 1 INJECTION, SOLUTION INTRAMUSCULAR; INTRAVENOUS; SUBCUTANEOUS at 12:47

## 2023-08-19 ASSESSMENT — ENCOUNTER SYMPTOMS
FEVER: 0
HEADACHES: 0
PALPITATIONS: 0
CARDIOVASCULAR NEGATIVE: 1
ABDOMINAL PAIN: 1
BACK PAIN: 0
CONSTIPATION: 0
RESPIRATORY NEGATIVE: 1
MUSCULOSKELETAL NEGATIVE: 1
DIARRHEA: 0
MEMORY LOSS: 0
CHILLS: 0
WEIGHT LOSS: 1
BLOOD IN STOOL: 0
VOMITING: 0
NAUSEA: 0
NAUSEA: 1
COUGH: 0
SHORTNESS OF BREATH: 0
NERVOUS/ANXIOUS: 1
DIZZINESS: 0

## 2023-08-19 ASSESSMENT — PAIN DESCRIPTION - PAIN TYPE
TYPE: ACUTE PAIN

## 2023-08-19 NOTE — PROGRESS NOTES
Received report from night shift RN. Assumed pt care 0700  Pt is A&Ox4, resting  in bed. Plan of care reviewed for activities and goals this shift. Medication eduction provided.  Pt verbalizes understanding of plan of care for this shift. Pt c/o pain medications administered PRN and as prescribed. Patients needs attended well. Fall precautions in place. Bed at lowest position. Call light and personal belongings within reach.   Hourly rounding in progress.

## 2023-08-19 NOTE — DIETARY
"Nutrition services: Day 3 of admit.  Megan Nicole is a 42 y.o. female with admitting DX of Recurrent acute pancreatitis [K85.90]    Consult received for FTT on 8/18: \"on soups at home, lost 10lbs, possible gastric mass.\" RD met w/ pt at bedside; pt reports wt loss 10 lb (135 lb-->125lb, 7.4%, severe) x past 1-2 months due to decreased appetite and stomach pain the past 4 weeks. Reports pain more severe x2 weeks PTA, w/ very little PO intake over 1 week PTA, consuming items such as soups and broths, no protein shakes/supplements. Pt agreeable to trial Boost Breeze to optimize kcal/protein intake while on limited clear liquid diet; agreeable to Boost Plus when diet advanced.    Assessment:  Height: 167.6 cm (5' 5.98\")  Weight: 56.5 kg (124 lb 9 oz) - via stand up scale  Body mass index is 20.11 kg/m²., BMI classification: Normal  Diet/Intake: Clear liquids; % x 2 meals; frequent NPO orders    Evaluation:   Pt presented w/ c/o abdominal pain w/ onset 2 weeks ago, worsening; N/V w/ poor PO intake  S/p EGD 8/18 per GI note: findings include stomach mass, \"Consistent with extrinsic compression...Decreasing gastric volume by two thirds.\" Biopsy of antrum taken to r/o H. pylori; biopsy of duodenum for celiac sprue.  Labs: BUN 5, crea 0.39, alb 2.8, phos 2.2, lipase 112  MAR: dilaudid, NS, protonix, senna (refused)  Skin: no documented wounds or edema  GI: last documented BM 8/16    Malnutrition Risk: Pt meets criteria for severe acute malnutrition r/t abdominal pain AEB pt reports severe wt loss 7.4% in 1-2 months and reports PO intake <50% of needs for >/=7 days.    Recommendations/Plan:  Provide Boost Breeze TID w/ meals to bolster kcal/protein intake while on clear liquid diet; advance supplements w/ diet order as appropriate.   Patient will tolerate diet >Clear liquids and consume >50% of meals and supplements.    Document intake of all PO as % taken in ADL's to provide interdisciplinary communication across all " shifts.   Monitor weight.  Nutrition rep will continue to see patient for ongoing meal and snack preferences.     RD following.

## 2023-08-19 NOTE — PROGRESS NOTES
Gastroenterology Progress Note               Author:  Yahir Michael M.D. with HUA Martins Date & Time Created: 8/19/2023 11:05 AM       Patient ID:  Name:             Megan Nicole    YOB: 1980  Age:                 42 y.o.  female  MRN:               [id number]      Referring Provider:  Solomon Lopez MD      Presenting Chief Complaint:  Abdominal pain, Pancreatitis, Abnormal Imaging      History of Present Illness:    This is a very pleasant 42 y.o. female with the past medical history includes hypertension.  She is seen in consultation for abdominal pain, pancreatitis, abnormal imaging.  Patient presented to the hospital on 8/16/2023 with complaints of upper abdominal pain, nausea, and vomiting.  The patient states that she has had ongoing waxing and waning upper abdominal pain over the last month or so, but over the last week it has become significantly worse.  She developed nausea and vomiting over the last 2 days prompting her to come to the emergency room.  Admission noted to have normal AST, ALT, T. bili.  However, lipase elevated 209.  CBC with globin 10.6, hematocrit 32.5 cytosis.  She denies fevers or chills.  CT abdomen pelvis with contrast demonstrates fluid collection tracking along the gastric fundus and greater curvature of the stomach with smaller similar-appearing fluid collections near the cardia, slight hazy peripancreatic fat stranding, area of low-density in the pancreatic head, and gastric wall thickening as well as scattered abdominal ascites.    The patient reports a significant alcohol history.  She states over the last several months she has been drinking 3 to 4 glasses of wine 4 times a week.  She reports increased stress at her job as a potential contributing factor.  She reports a history of 1 episode of pancreatitis 5 years ago without complications.  She reports loss over the last month and anorexia.    Interval History  8/18/2023: EGD  Esophagus            Unremarkable mucosa  Stomach            Mass                  Consistent with extrinsic compression                  Decreasing gastric volume by two thirds                  Posterior greater curvature of stomach                  At least 7 cm with x 12 cm length                  Erythematous, smooth overlying mucosa biopsied             Antrum                   Mild erythema, edema and mosaic mucosal pattern                   Biopsy to rule out H. pylori             Duodenum                   Unremarkable mucosa biopsied for celiac sprue    8/19/2023: Stable. Still with significant abdominal epigastric pain and bloating. Tolerating a liquid diet without increased pain. No current vomiting. No BM for 5-6 days.     Review of Systems:  Review of Systems   Constitutional:  Positive for weight loss. Negative for chills and fever.   Respiratory: Negative.     Cardiovascular: Negative.    Gastrointestinal:  Positive for abdominal pain. Negative for blood in stool, melena, nausea and vomiting.   Genitourinary: Negative.    Musculoskeletal: Negative.    Neurological:  Negative for dizziness and headaches.   Psychiatric/Behavioral:  Negative for memory loss. The patient is nervous/anxious.              Past Medical History:  Past Medical History:   Diagnosis Date    Hypertension      Active Hospital Problems    Diagnosis     Hypomagnesemia [E83.42]     Metabolic acidosis [E87.20]     Intra-abdominal abscess (HCC) [K65.1]     Hypokalemia [E87.6]     Thrombocytosis [D75.839]     Recurrent acute pancreatitis [K85.90]     HTN (hypertension) [I10]          Past Surgical History:  Past Surgical History:   Procedure Laterality Date    WA UPPER GI ENDOSCOPY,DIAGNOSIS N/A 8/18/2023    Procedure: GASTROSCOPY;  Surgeon: Yahir Michael M.D.;  Location: SURGERY [facility];  Service: Gastroenterology           Hospital Medications:  Current Facility-Administered Medications   Medication Dose Frequency Provider Last Rate Last  Admin    pantoprazole (Protonix) injection 40 mg  40 mg BID Solomon Lopez M.D.   40 mg at 08/19/23 0523    HYDROmorphone (Dilaudid) tablet 2 mg  2 mg Q3HRS PRN Solomon Lopez M.D.   2 mg at 08/19/23 0835    Or    HYDROmorphone (Dilaudid) tablet 4 mg  4 mg Q3HRS PRN Solomon Lopez M.D.        HYDROmorphone (Dilaudid) injection 1 mg  1 mg Q3HRS PRN Solomon Lopez M.D.   1 mg at 08/19/23 0523    NIFEdipine SR (Procadia-XL) tablet 30 mg  30 mg DAILY Mauricio Avalos D.O.   30 mg at 08/18/23 0549    senna-docusate (Pericolace Or Senokot S) 8.6-50 MG per tablet 2 Tablet  2 Tablet BID Mauricio vAalos D.O.   2 Tablet at 08/18/23 1734    And    polyethylene glycol/lytes (Miralax) PACKET 1 Packet  1 Packet QDAY PRN Mauricio Avalos D.O.        And    magnesium hydroxide (Milk Of Magnesia) suspension 30 mL  30 mL QDAY PRN Mauricio Avalos D.O.        And    bisacodyl (Dulcolax) suppository 10 mg  10 mg QDAY PRN Mauricio Avalos D.O.        NS infusion   Continuous Solomon Lopez M.D. 100 mL/hr at 08/19/23 0238 New Bag at 08/19/23 0238    Pharmacy Consult Request ...Pain Management Review 1 Each  1 Each PHARMACY TO DOSE Mauricio Avalos D.O.        labetalol (Normodyne/Trandate) injection 10 mg  10 mg Q4HRS PRN Mauricio Avalos D.O.        ondansetron (Zofran) syringe/vial injection 4 mg  4 mg Q4HRS PRN Mauricio Avalos D.O.        ondansetron (Zofran ODT) dispertab 4 mg  4 mg Q4HRS PRN Mauricio Avalos D.O.   4 mg at 08/17/23 0748    promethazine (Phenergan) tablet 12.5-25 mg  12.5-25 mg Q4HRS PRN Mauricio Avalos D.O.        promethazine (Phenergan) suppository 12.5-25 mg  12.5-25 mg Q4HRS PRN Mauricio Avalos D.O.        prochlorperazine (Compazine) injection 5-10 mg  5-10 mg Q4HRS PRN Mauricio Avalos D.O.        cefTRIAXone (Rocephin) 2,000 mg in  mL IVPB  2,000 mg Q24HRS Mauricio Avalos D.O.   Stopped at 08/18/23 1810    metroNIDAZOLE (Flagyl) IVPB 500 mg  500 mg Q12HRS Mauricio Avalos,  "D.O.   Stopped at 23 0625    diphenhydrAMINE (Benadryl) injection 25 mg  25 mg Q6HRS PRN Mauricio Avalos D.O.   25 mg at 23 0836   Last reviewed on 2023 12:21 PM by Niurka Hilario R.N.       Current Outpatient Medications:  Medications Prior to Admission   Medication Sig Dispense Refill Last Dose    NIFEdipine SR (PROCARDIA-XL) 30 MG tablet Take 30 mg by mouth every day. Indications: High Blood Pressure Disorder   8/15/2023    Prenatal Multivit-Min-Fe-FA (PRE-DMITRI FORMULA PO) Take 1 Tablet by mouth every day.   UNK         Medication Allergies:  Allergies   Allergen Reactions    Sulfa Drugs Hives and Rash     rash         Family Medical History:  History reviewed. No pertinent family history.      Social History:  Social History     Socioeconomic History    Marital status: Single     Spouse name: Not on file    Number of children: Not on file    Years of education: Not on file    Highest education level: Not on file   Occupational History    Not on file   Tobacco Use    Smoking status: Never    Smokeless tobacco: Never   Vaping Use    Vaping Use: Never used   Substance and Sexual Activity    Alcohol use: Not Currently    Drug use: Never    Sexual activity: Not on file   Other Topics Concern    Not on file   Social History Narrative    Not on file     Social Determinants of Health     Financial Resource Strain: Not on file   Food Insecurity: Not on file   Transportation Needs: Not on file   Physical Activity: Not on file   Stress: Not on file   Social Connections: Not on file   Intimate Partner Violence: Not on file   Housing Stability: Not on file         Vital signs:  Weight/BMI: Body mass index is 20.11 kg/m².  /78   Pulse 94   Temp 36.9 °C (98.4 °F) (Temporal)   Resp 18   Ht 1.676 m (5' 5.98\")   Wt 56.5 kg (124 lb 9 oz)   SpO2 98%   Vitals:    23 2016 23 0235 23 0839 23 0900   BP: 104/71 107/72  117/78   Pulse: 91 76 89 94   Resp: 18 18 18 18   Temp: 37.1 °C " (98.8 °F) 36.3 °C (97.4 °F)  36.9 °C (98.4 °F)   TempSrc: Temporal Temporal  Temporal   SpO2: 96% 96% 98% 98%   Weight:       Height:         Oxygen Therapy:  Pulse Oximetry: 98 %, O2 (LPM): 0, O2 Delivery Device: None - Room Air    Intake/Output Summary (Last 24 hours) at 8/19/2023 1105  Last data filed at 8/18/2023 1420  Gross per 24 hour   Intake 1000 ml   Output --   Net 1000 ml           Physical Exam:  Physical Exam  Vitals and nursing note reviewed.   Constitutional:       Appearance: Normal appearance.   HENT:      Head: Normocephalic and atraumatic.      Right Ear: External ear normal.      Left Ear: External ear normal.      Nose: Nose normal. No congestion.      Mouth/Throat:      Mouth: Mucous membranes are moist.      Pharynx: Oropharynx is clear.   Eyes:      General: No scleral icterus.     Extraocular Movements: Extraocular movements intact.      Pupils: Pupils are equal, round, and reactive to light.   Cardiovascular:      Rate and Rhythm: Normal rate and regular rhythm.      Pulses: Normal pulses.      Heart sounds: Normal heart sounds. No murmur heard.  Pulmonary:      Effort: Pulmonary effort is normal. No respiratory distress.      Breath sounds: Normal breath sounds.   Abdominal:      General: Abdomen is flat. Bowel sounds are normal. There is distension (Mild).      Palpations: Abdomen is soft.      Tenderness: There is abdominal tenderness (Epigastric and LUQ).   Musculoskeletal:      Cervical back: Neck supple.      Right lower leg: No edema.      Left lower leg: No edema.   Lymphadenopathy:      Cervical: No cervical adenopathy.   Skin:     General: Skin is warm and dry.   Neurological:      General: No focal deficit present.      Mental Status: She is alert and oriented to person, place, and time.   Psychiatric:         Thought Content: Thought content normal.         Judgment: Judgment normal.           Labs:  Recent Labs     08/16/23 2014 08/17/23  0027 08/18/23  0119   SODIUM 137 137  136   POTASSIUM 3.3* 3.5* 3.6   CHLORIDE 99 102 106   CO2 22 22 18*   BUN 14 12 5*   CREATININE 0.53 0.49* 0.39*   MAGNESIUM  --   --  1.6   PHOSPHORUS  --   --  2.2*   CALCIUM 9.4 8.5 8.4       Recent Labs     08/16/23 2014 08/17/23  0027 08/18/23  0119   ALTSGPT 9 9 7   ASTSGOT 20 16 15   ALKPHOSPHAT 101* 84 75   TBILIRUBIN 0.4 0.3 0.2   LIPASE 209*  --  112*   GLUCOSE 94 85 71       Recent Labs     08/16/23 2014 08/17/23  0027 08/18/23  0119   WBC 8.5 8.1 4.7*   NEUTSPOLYS 82.20*  --   --    LYMPHOCYTES 13.10*  --   --    MONOCYTES 3.80  --   --    EOSINOPHILS 0.40  --   --    BASOPHILS 0.10  --   --    ASTSGOT 20 16 15   ALTSGPT 9 9 7   ALKPHOSPHAT 101* 84 75   TBILIRUBIN 0.4 0.3 0.2       Recent Labs     08/16/23 2014 08/17/23  0027 08/18/23  0119   RBC 3.86* 3.43* 3.27*   HEMOGLOBIN 12.0 10.6* 10.2*   HEMATOCRIT 36.4* 32.5* 31.5*   PLATELETCT 457* 428 370       Recent Results (from the past 24 hour(s))   Histology Request    Collection Time: 08/18/23  1:14 PM   Result Value Ref Range    Pathology Request Sent to Histo          Radiology Review:  US-RUQ   Final Result      Uniform gastric wall thickening could reflect gastritis/peptic ulcer disease      Small ascites      4 cm cystic collection adjacent to the caudate corresponds to the smaller of the rim-enhancing fluid collections on comparison CT. This could be a pseudocyst or abscess      Lesser sac pseudocyst or abscess on comparison is not visualized sonographically      Echogenic liver, a nonspecific finding that often is found to represent steatosis.  Other infiltrative processes could have an identical appearance      Left lobe liver simple 19 mm cyst      CT-ABDOMEN-PELVIS WITH   Final Result         1.  Fluid collection tracking along the gastric fundus and greater curvature the stomach. Smaller similar appearing fluid collection is seen near the gastric cardia. Could represent pseudocyst or loculated abdominal abscess.   2.  Slight hazy  peripancreatic fat stranding favoring changes of pancreatitis.   3.  Area of low-density in the pancreatic head, could be related to suspected pancreatitis although pancreatic pseudocyst or other pancreatic mass is not excluded.   4.  Gastric wall thickening, appearance suggesting changes of gastritis otherwise indeterminate, could be related to suspected perigastric abscess or pseudocyst.   5.  Scattered abdominal ascites            MDM (Data Review):   -Records reviewed and summarized in current documentation  -I personally reviewed and interpreted the laboratory results  -I personally reviewed the radiology images        Medical Decision Making, by Problem:  Active Hospital Problems    Diagnosis     Hypomagnesemia [E83.42]     Metabolic acidosis [E87.20]     Intra-abdominal abscess (HCC) [K65.1]     Hypokalemia [E87.6]     Thrombocytosis [D75.839]     Recurrent acute pancreatitis [K85.90]     HTN (hypertension) [I10]            Assessment/Recommendations:  42-year-old female abdominal pain, nausea, and vomiting.  Labs and imaging consistent with acute severe pancreatitis with edema and adjacent fluid collections/developing pseudocysts. There is mass effect on the stomach. EGD without evidence of intraluminal pathology in the stomach or duodenum.     Assessment:  -Epigastric and left upper quadrant abdominal pain  -Nausea and vomiting  -Acute pancreatitis adjacent edema and fluid collections suspect secondary to alcohol use  -Abnormal imaging of the gastric wall consistent with pancreatitis and pseudocyst extrinsic compression  -Anorexia  -Weight loss    Plan:  -Diet as tolerated, recommend ultimately low fat as she advances  -Miralax 17 g BID to facilitate BM since patient hasn't had a BM in several days.   -Monitor for worsening distension or developing vomiting  -KUB in AM to evaluate for potential developing ileus  -MARGARITA, IgG4 pending  -Repeat CT pancreatic protocol in 4-6 weeks. Consider outpatient EUS and if  needed pseudocyst drainage  -Outpatient follow up with [facility] in 4-8 weeks    GI will sign off    GI Attending -    Patient seen, examined, chart reviewed and case discussed and plan arrived at with ALIYAH.  Agree with this note.      Yahir Michael M.D.    Graham Rodriguez, ALIYAH.      Thank you for inviting me to participate in the care of this patient. Please do not hesitate to call GI consultants with additional questions/concerns or changes in the patient's clinical status at [phone].      Core Quality Measures   Reviewed items:  Labs, Medications and Radiology reports reviewed

## 2023-08-19 NOTE — PROGRESS NOTES
Hourly rounding complete updated the board  Hourly rounding complete pt is eating  Hourly rounding complete pt is resting  Hourly rounding complete pt is watching tv  Hourly rounding complete pt is eating  Hourly rounding complete pt is resting  Hourly rounding complete pt is resting

## 2023-08-19 NOTE — CARE PLAN
The patient is Stable - Low risk of patient condition declining or worsening    Shift Goals  Clinical Goals: Continue with IVF/ ABX, manage pain  Patient Goals: Control pain & sleep  Family Goals: NA    Progress made toward(s) clinical / shift goals:    Problem: Pain - Standard  Goal: Alleviation of pain or a reduction in pain to the patient’s comfort goal  Outcome: Progressing     Problem: Knowledge Deficit - Standard  Goal: Patient and family/care givers will demonstrate understanding of plan of care, disease process/condition, diagnostic tests and medications  Outcome: Progressing  Note: Reviewed POC with patient, all questions answered.      Problem: Self Care  Goal: Patient will have the ability to perform ADLs independently or with assistance (bathe, groom, dress, toilet and feed)  Outcome: Progressing       Patient is not progressing towards the following goals:

## 2023-08-19 NOTE — CARE PLAN
The patient is Stable - Low risk of patient condition declining or worsening    Shift Goals  Clinical Goals: pain control, NPO  Patient Goals: rest, pain control, EGD  Family Goals: NA    Progress made toward(s) clinical / shift goals: Pt remained NPO prior to EGD, now tolerating her current diet. Denies nausea and vomiting. PRN pain medications administered per MAR, pain controlled.     Patient is not progressing towards the following goals:NPO

## 2023-08-20 VITALS
TEMPERATURE: 98.7 F | HEIGHT: 66 IN | SYSTOLIC BLOOD PRESSURE: 122 MMHG | RESPIRATION RATE: 16 BRPM | OXYGEN SATURATION: 96 % | DIASTOLIC BLOOD PRESSURE: 84 MMHG | WEIGHT: 124.56 LBS | BODY MASS INDEX: 20.02 KG/M2 | HEART RATE: 80 BPM

## 2023-08-20 PROBLEM — K86.89 PANCREATIC INSUFFICIENCY: Status: ACTIVE | Noted: 2023-08-20

## 2023-08-20 LAB
ALBUMIN SERPL BCP-MCNC: 2.7 G/DL (ref 3.2–4.9)
ALBUMIN/GLOB SERPL: 0.8 G/DL
ALP SERPL-CCNC: 64 U/L (ref 30–99)
ALT SERPL-CCNC: 5 U/L (ref 2–50)
ANION GAP SERPL CALC-SCNC: 10 MMOL/L (ref 7–16)
AST SERPL-CCNC: 13 U/L (ref 12–45)
BILIRUB SERPL-MCNC: <0.2 MG/DL (ref 0.1–1.5)
BUN SERPL-MCNC: <2 MG/DL (ref 8–22)
CALCIUM ALBUM COR SERPL-MCNC: 9.3 MG/DL (ref 8.5–10.5)
CALCIUM SERPL-MCNC: 8.3 MG/DL (ref 8.4–10.2)
CHLORIDE SERPL-SCNC: 105 MMOL/L (ref 96–112)
CO2 SERPL-SCNC: 21 MMOL/L (ref 20–33)
CREAT SERPL-MCNC: 0.38 MG/DL (ref 0.5–1.4)
ERYTHROCYTE [DISTWIDTH] IN BLOOD BY AUTOMATED COUNT: 42 FL (ref 35.9–50)
GFR SERPLBLD CREATININE-BSD FMLA CKD-EPI: 129 ML/MIN/1.73 M 2
GLOBULIN SER CALC-MCNC: 3.3 G/DL (ref 1.9–3.5)
GLUCOSE SERPL-MCNC: 79 MG/DL (ref 65–99)
HCT VFR BLD AUTO: 30.5 % (ref 37–47)
HGB BLD-MCNC: 9.8 G/DL (ref 12–16)
IGG1 SER-MCNC: 1088 MG/DL (ref 240–1118)
IGG2 SER-MCNC: 373 MG/DL (ref 124–549)
IGG3 SER-MCNC: 60 MG/DL (ref 21–134)
IGG4 SER-MCNC: 108 MG/DL (ref 1–123)
LIPASE SERPL-CCNC: 130 U/L (ref 11–82)
MAGNESIUM SERPL-MCNC: 1.7 MG/DL (ref 1.5–2.5)
MCH RBC QN AUTO: 30.7 PG (ref 27–33)
MCHC RBC AUTO-ENTMCNC: 32.1 G/DL (ref 32.2–35.5)
MCV RBC AUTO: 95.6 FL (ref 81.4–97.8)
PHOSPHATE SERPL-MCNC: 2.5 MG/DL (ref 2.5–4.5)
PLATELET # BLD AUTO: 389 K/UL (ref 164–446)
PMV BLD AUTO: 8.6 FL (ref 9–12.9)
POTASSIUM SERPL-SCNC: 3.1 MMOL/L (ref 3.6–5.5)
PROT SERPL-MCNC: 6 G/DL (ref 6–8.2)
RBC # BLD AUTO: 3.19 M/UL (ref 4.2–5.4)
SODIUM SERPL-SCNC: 136 MMOL/L (ref 135–145)
WBC # BLD AUTO: 4.1 K/UL (ref 4.8–10.8)

## 2023-08-20 PROCEDURE — A9270 NON-COVERED ITEM OR SERVICE: HCPCS | Performed by: INTERNAL MEDICINE

## 2023-08-20 PROCEDURE — 700102 HCHG RX REV CODE 250 W/ 637 OVERRIDE(OP): Performed by: INTERNAL MEDICINE

## 2023-08-20 PROCEDURE — 84100 ASSAY OF PHOSPHORUS: CPT

## 2023-08-20 PROCEDURE — 85027 COMPLETE CBC AUTOMATED: CPT

## 2023-08-20 PROCEDURE — 94760 N-INVAS EAR/PLS OXIMETRY 1: CPT

## 2023-08-20 PROCEDURE — 36415 COLL VENOUS BLD VENIPUNCTURE: CPT

## 2023-08-20 PROCEDURE — C9113 INJ PANTOPRAZOLE SODIUM, VIA: HCPCS | Performed by: INTERNAL MEDICINE

## 2023-08-20 PROCEDURE — 99239 HOSP IP/OBS DSCHRG MGMT >30: CPT | Performed by: INTERNAL MEDICINE

## 2023-08-20 PROCEDURE — 83735 ASSAY OF MAGNESIUM: CPT

## 2023-08-20 PROCEDURE — 700111 HCHG RX REV CODE 636 W/ 250 OVERRIDE (IP): Performed by: INTERNAL MEDICINE

## 2023-08-20 PROCEDURE — 83690 ASSAY OF LIPASE: CPT

## 2023-08-20 PROCEDURE — 80053 COMPREHEN METABOLIC PANEL: CPT

## 2023-08-20 RX ORDER — OMEPRAZOLE 20 MG/1
20 CAPSULE, DELAYED RELEASE ORAL 2 TIMES DAILY
Qty: 60 CAPSULE | Refills: 1 | Status: SHIPPED | OUTPATIENT
Start: 2023-08-20 | End: 2023-09-19

## 2023-08-20 RX ORDER — OMEPRAZOLE 20 MG/1
20 CAPSULE, DELAYED RELEASE ORAL 2 TIMES DAILY
Status: DISCONTINUED | OUTPATIENT
Start: 2023-08-20 | End: 2023-08-20 | Stop reason: HOSPADM

## 2023-08-20 RX ORDER — HYDROMORPHONE HYDROCHLORIDE 2 MG/1
2 TABLET ORAL EVERY 6 HOURS PRN
Qty: 20 TABLET | Refills: 0 | Status: SHIPPED | OUTPATIENT
Start: 2023-08-20 | End: 2023-08-25

## 2023-08-20 RX ORDER — PANCRELIPASE LIPASE, PANCRELIPASE AMYLASE, AND PANCRELIPASE PROTEASE 2600; 10850; 6200 [USP'U]/1; [USP'U]/1; [USP'U]/1
1 CAPSULE, DELAYED RELEASE ORAL
Qty: 90 CAPSULE | Refills: 1 | Status: SHIPPED | OUTPATIENT
Start: 2023-08-20

## 2023-08-20 RX ORDER — MAGNESIUM SULFATE HEPTAHYDRATE 40 MG/ML
2 INJECTION, SOLUTION INTRAVENOUS ONCE
Status: COMPLETED | OUTPATIENT
Start: 2023-08-20 | End: 2023-08-20

## 2023-08-20 RX ORDER — ONDANSETRON 4 MG/1
4 TABLET, ORALLY DISINTEGRATING ORAL EVERY 8 HOURS PRN
Qty: 30 TABLET | Refills: 0 | Status: SHIPPED | OUTPATIENT
Start: 2023-08-20 | End: 2023-09-19

## 2023-08-20 RX ORDER — MAGNESIUM GLYCINATE 100 MG
1 CAPSULE ORAL
COMMUNITY
Start: 2023-08-20

## 2023-08-20 RX ADMIN — PANTOPRAZOLE SODIUM 40 MG: 40 INJECTION, POWDER, LYOPHILIZED, FOR SOLUTION INTRAVENOUS at 05:54

## 2023-08-20 RX ADMIN — SENNOSIDES AND DOCUSATE SODIUM 2 TABLET: 50; 8.6 TABLET ORAL at 05:53

## 2023-08-20 RX ADMIN — DIPHENHYDRAMINE HYDROCHLORIDE 25 MG: 50 INJECTION, SOLUTION INTRAMUSCULAR; INTRAVENOUS at 07:22

## 2023-08-20 RX ADMIN — NIFEDIPINE 30 MG: 30 TABLET, EXTENDED RELEASE ORAL at 05:53

## 2023-08-20 RX ADMIN — POTASSIUM BICARBONATE 50 MEQ: 978 TABLET, EFFERVESCENT ORAL at 07:22

## 2023-08-20 RX ADMIN — MAGNESIUM SULFATE HEPTAHYDRATE 2 G: 2 INJECTION, SOLUTION INTRAVENOUS at 07:30

## 2023-08-20 RX ADMIN — METRONIDAZOLE 500 MG: 5 INJECTION, SOLUTION INTRAVENOUS at 06:09

## 2023-08-20 RX ADMIN — HYDROMORPHONE HYDROCHLORIDE 2 MG: 2 TABLET ORAL at 05:50

## 2023-08-20 RX ADMIN — HYDROMORPHONE HYDROCHLORIDE 2 MG: 2 TABLET ORAL at 07:35

## 2023-08-20 ASSESSMENT — COGNITIVE AND FUNCTIONAL STATUS - GENERAL
DAILY ACTIVITIY SCORE: 24
SUGGESTED CMS G CODE MODIFIER MOBILITY: CH
SUGGESTED CMS G CODE MODIFIER DAILY ACTIVITY: CH
MOBILITY SCORE: 24

## 2023-08-20 ASSESSMENT — PAIN DESCRIPTION - PAIN TYPE
TYPE: ACUTE PAIN

## 2023-08-20 NOTE — PROGRESS NOTES
Received bedside report from day shift RN at 19:15.   Assumed pt care. Pt seen AO4, Pain reported at 5/10 medicated per mar. No nausea reported at this time.   POC and goals discussed. Chart check completed.  Safety and fall precautions in place.   Instructed pt to use call light, verbalized understanding.   Call light kept within reach.  No other needs at this time.   Will continue to monitor.

## 2023-08-20 NOTE — CARE PLAN
The patient is Stable - Low risk of patient condition declining or worsening    Shift Goals  Clinical Goals: level of comfort to perform ambulaltion  Patient Goals: discharge  Family Goals: NA    Progress made toward(s) clinical / shift goals:  in progress ambulate hallways      Patient is not progressing towards the following goals:

## 2023-08-20 NOTE — DISCHARGE INSTRUCTIONS
Diet  Activity    Resume Your Normal Activity    You may resume your normal activity as tolerated.  Rest as needed.        Resume your normal diet as tolerated.  A diet low in cholesterol, fat, and sodium is recommended for good health.     Soft Food Diet    A Soft Food Diet includes foods that are safe and easy to swallow. Generally, the food should be soft enough to be mashed with a fork. Take small bites of food, or cut food into pieces about ½ inch or smaller. Avoid foods that are dry, hard to chew, crunchy, sticky, stringy, or crispy.

## 2023-08-20 NOTE — PROGRESS NOTES
19:48 VS assessed, medicated PRN for pain.   20:00 Ginger ale given per pt request.  23:29 Pain reported, medicated per mar. New bag of NS placed.  00:30 Pt asleep, respirations unlabored, Pt on .  02:05 Pt asleep, respirations unlabored.  04:01 Pt asleep, respirations unlabored.  05:50 RN to bedside for med pass. PRN pain med given per mar.  07:10 Bedside report given to RN Cher

## 2023-08-20 NOTE — CARE PLAN
The patient is Stable - Low risk of patient condition declining or worsening    Shift Goals  Clinical Goals: Pt will report pain at 3/10 or lower post interventions this shift, tolerate diet  Patient Goals: Pain and itching mgt  Family Goals: NA    Progress made toward(s) clinical / shift goals:  PRN pain and itching meds given per mar, comfort measures provided- ginger ale given per pt request. Pt reported pain at 3/10 post interventions, was seen asleep during rounds.    Patient is not progressing towards the following goals: N.a

## 2023-08-20 NOTE — PROGRESS NOTES
[facility] Medicine Daily Progress Note    Date of Service  8/19/2023    Chief Complaint  Megan Nicole is a 42 y.o. female admitted 8/16/2023 with   Chief Complaint   Patient presents with    Abdominal Pain     Started a couple weeks ago, starting to get worst. Generalized abdominal pain, mostly left flank pain.     N/V     Pt states she hasn't really been able to eat b/c of the abdominal pain and the n/v.      Hospital Course  No notes on file    Interval Problem Update  8/17:  Patient reporting severe pain.  She stated she has had pancreatitis before, she did drink alcohol but drinks maybe 3 times per week, maybe 2-4 glasses of wine during a session. She stated she has been stressed. She has lost about 10lbs unintentionally. She has had ongoing abdominal pains and could only eat soups.   - I reviewed CT A/P at bedside, I showed patient her gastric imaging with a thickened fundus area. She denied any Fhx of gastric cancers. I reviewed images of her pancreatitis.  We discussed ETOH cessation completely as this will trigger future pancreatitis.  - She mentioned she was going to see [facility] specialist, but has not been able to get an appointment yet.  I have reached out to [facility] for evaluation, concerning for gastric abscess or potential neoplasm.  - I will continue IV Ceftriaxone and IV Flagyl for potential abscess.  - I reviewed RUQ US, CBD measured 0.66cm not dilated, final reading pending by radiologist.    8/18:  Patient stated her pain is better controlled with Dilaudid but still ongoing.  - We are pending EGD today 145  - Keep n.p.o. until then, possibly clear liquid diets afterwards  -Patient mention she has a Fhx mother with mixed connective tissue - sjorgens, lupus and others.    8/19:  Patient stated her pain is moderate in nature at this time.  Dilaudid 4 mg is working better for more severe pain.  - She is willing to try more solid today, I have changed diet.  - Status post EGD, which found pancreatic cyst  pushing on to the stomach.  Stomach also has signs of erythema possibly gastritis.    I have discussed this patient's plan of care and discharge plan at IDT rounds today with Case Management, Nursing, Nursing leadership, and other members of the IDT team.    Consultants/Specialty  GI    Code Status  Full Code    Disposition  The patient is not medically cleared for discharge to home or a post-acute facility.  Anticipate discharge to: home with close outpatient follow-up    I have placed the appropriate orders for post-discharge needs.    Review of Systems  Review of Systems   Constitutional:  Positive for malaise/fatigue and weight loss. Negative for chills and fever.   Respiratory:  Negative for cough and shortness of breath.    Cardiovascular:  Negative for chest pain and palpitations.   Gastrointestinal:  Positive for abdominal pain and nausea. Negative for constipation, diarrhea and vomiting.   Musculoskeletal:  Negative for back pain and joint pain.   Neurological:  Negative for dizziness and headaches.   All other systems reviewed and are negative.       Physical Exam  Temp:  [36.3 °C (97.4 °F)-37.1 °C (98.8 °F)] 37 °C (98.6 °F)  Pulse:  [76-94] 86  Resp:  [17-18] 17  BP: (104-125)/(71-89) 125/89  SpO2:  [96 %-99 %] 99 %    Physical Exam  Vitals and nursing note reviewed.   Constitutional:       General: She is in acute distress.      Appearance: She is not ill-appearing.   HENT:      Head: Normocephalic and atraumatic.   Eyes:      General: No scleral icterus.     Extraocular Movements: Extraocular movements intact.   Cardiovascular:      Rate and Rhythm: Normal rate.      Pulses: Normal pulses.      Heart sounds: Normal heart sounds. No murmur heard.  Pulmonary:      Effort: Pulmonary effort is normal. No respiratory distress.      Breath sounds: Normal breath sounds.   Abdominal:      General: Bowel sounds are normal. There is distension.      Palpations: Abdomen is soft.      Tenderness: There is abdominal  tenderness.   Musculoskeletal:         General: No swelling or tenderness. Normal range of motion.      Cervical back: Normal range of motion and neck supple.   Skin:     General: Skin is warm.      Capillary Refill: Capillary refill takes less than 2 seconds.      Coloration: Skin is not jaundiced.      Findings: No erythema.   Neurological:      General: No focal deficit present.      Mental Status: She is alert and oriented to person, place, and time. Mental status is at baseline.      Motor: No weakness.   Psychiatric:         Mood and Affect: Mood normal.         Behavior: Behavior normal.         Thought Content: Thought content normal.         Judgment: Judgment normal.         Fluids    Intake/Output Summary (Last 24 hours) at 8/19/2023 1728  Last data filed at 8/19/2023 1138  Gross per 24 hour   Intake 360 ml   Output --   Net 360 ml       Laboratory  Recent Labs     08/16/23 2014 08/17/23 0027 08/18/23  0119   WBC 8.5 8.1 4.7*   RBC 3.86* 3.43* 3.27*   HEMOGLOBIN 12.0 10.6* 10.2*   HEMATOCRIT 36.4* 32.5* 31.5*   MCV 94.3 94.8 96.3   MCH 31.1 30.9 31.2   MCHC 33.0 32.6 32.4   RDW 41.6 41.8 42.8   PLATELETCT 457* 428 370   MPV 8.2* 8.6* 8.7*     Recent Labs     08/16/23 2014 08/17/23  0027 08/18/23  0119   SODIUM 137 137 136   POTASSIUM 3.3* 3.5* 3.6   CHLORIDE 99 102 106   CO2 22 22 18*   GLUCOSE 94 85 71   BUN 14 12 5*   CREATININE 0.53 0.49* 0.39*   CALCIUM 9.4 8.5 8.4                   Imaging  US-RUQ   Final Result      Uniform gastric wall thickening could reflect gastritis/peptic ulcer disease      Small ascites      4 cm cystic collection adjacent to the caudate corresponds to the smaller of the rim-enhancing fluid collections on comparison CT. This could be a pseudocyst or abscess      Lesser sac pseudocyst or abscess on comparison is not visualized sonographically      Echogenic liver, a nonspecific finding that often is found to represent steatosis.  Other infiltrative processes could have an  identical appearance      Left lobe liver simple 19 mm cyst      CT-ABDOMEN-PELVIS WITH   Final Result         1.  Fluid collection tracking along the gastric fundus and greater curvature the stomach. Smaller similar appearing fluid collection is seen near the gastric cardia. Could represent pseudocyst or loculated abdominal abscess.   2.  Slight hazy peripancreatic fat stranding favoring changes of pancreatitis.   3.  Area of low-density in the pancreatic head, could be related to suspected pancreatitis although pancreatic pseudocyst or other pancreatic mass is not excluded.   4.  Gastric wall thickening, appearance suggesting changes of gastritis otherwise indeterminate, could be related to suspected perigastric abscess or pseudocyst.   5.  Scattered abdominal ascites           Assessment/Plan  * Recurrent acute pancreatitis- (present on admission)  Assessment & Plan  Likely due to alcohol abuse however patient states she did quit drinking again 1 month ago which would be at least 2 weeks prior to onset of symptoms  Lipase trended down  Pain more from pseudocyst mass effect onto stomach    Passive congestion of stomach due to pancreatic pseudocyst mass effect- (present on admission)  Assessment & Plan  Multiple fluid collections noted on CT scan  ERP did discuss the case with general surgery who will follow along, stated no need for drainage at this time, recommended IV antibiotics  Not causing sepsis  Await culture results    Dr. Leonardo notified by EDP.    She stated she has been stressed. She has lost about 10lbs unintentionally. She has had ongoing abdominal pains and could only eat soups.  - She mentioned she was going to see [facility] specialist, but has not been able to get an appointment yet.  I have reached out to [facility] for evaluation, concerning for gastric abscess or potential neoplasm.  - I reviewed RUQ US, CBD measured 0.66cm not dilated, final reading pending by radiologist.    8/18:  We are pending EGD today  1:45PM  8/19: Status post EGD, which found pancreatic cyst pushing on to the stomach.  Stomach also has signs of erythema possibly gastritis.  I spoke with GI, will obtain KUB in the morning.    Intractable abdominal pain- (present on admission)  Assessment & Plan  Pain is from mass effect of 6 pancreatic pseudocyst onto stomach.  - I changed patient's medications to Dilaudid, currently on Dilaudid 2 mg and 4 mg for moderate and severe pain.  Also has IV Dilaudid.  - 4 mg Dilaudid worked better today.  We will continue current pain regimen.    Metabolic acidosis  Assessment & Plan  Bicarb level 18  Patient has evidence of starvation ketosis, UA has ketones  - Continue clear liquid diet, patient is having trouble with solid foods and nausea  -I placed consult for nutritionist  -I am repeating labs in the morning    Hypomagnesemia  Assessment & Plan  Replace via IV, I am repeating labs in the morning    Thrombocytosis- (present on admission)  Assessment & Plan  Mild, likely due to dehydration  plt 428  Continue IVF    Hypokalemia- (present on admission)  Assessment & Plan  I am repeating labs in the morning    HTN (hypertension)- (present on admission)  Assessment & Plan  Continue home nifedipine  Start as needed labetalol  Adjust as needed  -Continue IV fluids, monitor closely after procedure she may become hypotensive         VTE prophylaxis:   SCDs/TEDs      I have performed a physical exam and reviewed and updated ROS and Plan today (8/19/2023). In review of yesterday's note (8/18/2023), there are no changes except as documented above.      Total time spent 51 minutes. I spent greater than 50% of the time for patient care, counseling, and coordination on this date, including unit/floor time, and face-to-face time with the patient as per interval events, my own review of patient's imaging and lab analysis and developing my assessment and plan above.

## 2023-08-20 NOTE — DISCHARGE SUMMARY
"Discharge Summary    CHIEF COMPLAINT ON ADMISSION  Chief Complaint   Patient presents with    Abdominal Pain     Started a couple weeks ago, starting to get worst. Generalized abdominal pain, mostly left flank pain.     N/V     Pt states she hasn't really been able to eat b/c of the abdominal pain and the n/v.        Reason for Admission  Abdominal Pain      Admission Date  8/16/2023    CODE STATUS  Prior    HPI & HOSPITAL COURSE  This is \"Megan Nicole is a 42 y.o. female who presented 8/16/2023 with abdominal pain.  She states the pain started approximately 2 weeks ago.  She states initially it was epigastric and right upper quadrant, now it has transition to epigastric and left upper quadrant.  She states that sharp and severe at times.  She does complain of nausea and vomiting as well.  She does have a history of alcoholic pancreatitis, she was recently drinking again however she states she stopped a month ago.  Upon arrival, patient did have a CT scan with fluid collections.  ERP did discuss the case with surgery who stated no need to drain however they did recommend antibiotics thinking that they were abscesses.  I did discuss the case including labs and imaging with ER physician.\" (As per admitting physician Dr. Avalos).    She stated she has had pancreatitis before, she did drink alcohol but drinks maybe 3 times per week, maybe 2-4 glasses of wine during a session. 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 She has had ongoing abdominal pains and could only eat soups.  Patient mention she has a Fhx mother with mixed connective tissue (sjorgens, lupus and others).    I reviewed CT A/P at bedside, I showed patient her gastric imaging with a thickened fundus area. She denied any Fhx of gastric cancers. I reviewed images of her pancreatitis.  We discussed ETOH cessation completely as this will trigger future pancreatitis.    Dr. Nicole mentioned she was going " to see [facility] specialist, but has not been able to get an appointment yet.  I have reached out to [facility] for evaluation, concerning for gastric abscess or potential neoplasm.    DrOg Nicole had been requiring ongoing IV Zofran and IV opioid pain control.  She was in significant amount of distress during the beginning of her hospitalization.    On 08/18/2023 patient went for EGD.  Dr. Michael noted that there was an extrinsic compression to the stomach, decreasing gastric volume by two thirds along the posterior greater curvature of the stomach.  There was also erythema and smooth overlying mucosa, mild erythema.    At this time GI's recommendations includes reimaging with a CT scan in 4 to 6 weeks to see any progression.  If remaining symptomatic after 6 weeks, today we will discuss on endoscopic ultrasound with cyst gastrostomy.      We also treated patient with IV ceftriaxone and Flagyl for total of 4 days.      She had improvement in her symptoms but still had ongoing abdominal pain.  She stated GI cocktail did help tremendously which I have prescribed for her.  I have also prescribed a short course of oral Dilaudid.  Patient will stay on GI soft, low-fat diet at this time.  I also suspect she may have pancreatic insufficiency after having a substantial pancreatitis and pseudocyst.  I have prescribed her pancrelipase but this may need prior authorization from her PCP before obtaining.    Therefore, she is discharged in good and stable condition to home with close outpatient follow-up.    The patient met 2-midnight criteria for an inpatient stay at the time of discharge.    Discharge Date  8/20/2023    FOLLOW UP ITEMS POST DISCHARGE  08/20/2023    DISCHARGE DIAGNOSES  Principal Problem:    Recurrent acute pancreatitis (POA: Yes)  Active Problems:    Passive congestion of stomach due to pancreatic pseudocyst mass effect (POA: Yes)    HTN (hypertension) (POA: Yes)    Hypokalemia (POA: Yes)    Thrombocytosis (POA: Yes)     Hypomagnesemia (POA: Clinically Undetermined)    Metabolic acidosis (POA: Clinically Undetermined)    Intractable abdominal pain (POA: Yes)    Pancreatic insufficiency (POA: Yes)  Resolved Problems:    * No resolved hospital problems. *      FOLLOW UP  No future appointments.  [facility]  [address]  [place] [id number] [phone]  Schedule an appointment as soon as possible for a visit in 1 week(s)  Please call [facility] for your next appointment for a follow up with Dr. Michael or associate.      MEDICATIONS ON DISCHARGE     Medication List        START taking these medications        Instructions   GI Cocktail (hyoscyamine-lidocaine-Maalox)   Doctor's comments: Ingredients: 34 mL hyoscyamine 0.125 mg/5 mL, 126 mL Maalox, and 40 mL viscous lidocaine 2%. Please fill appropriate mL for supply in stock.  Take 15 mL by mouth 2 times a day as needed (abdominal pain or cramping).  Dose: 15 mL     HYDROmorphone 2 MG Tabs  Commonly known as: Dilaudid   Take 1 Tablet by mouth every 6 hours as needed for Severe Pain for up to 5 days.  Dose: 2 mg     Magnesium Glycinate 100 MG Caps   Take 1 Capsule by mouth every day with lunch.  Dose: 1 Capsule     omeprazole 20 MG delayed-release capsule  Commonly known as: PriLOSEC   Take 1 Capsule by mouth 2 times a day for 30 days.  Dose: 20 mg     ondansetron 4 MG Tbdp  Commonly known as: Zofran ODT   Take 1 Tablet by mouth every 8 hours as needed for Nausea/Vomiting (give PO if no IV route available) for up to 30 days.  Dose: 4 mg     Pancreaze 4770-4986 units Cpep  Generic drug: Pancrelipase (Lip-Prot-Amyl)   Doctor's comments: Working on prior authorization  Take 1 Capsule by mouth 3 times a day before meals.  Dose: 1 Capsule            CONTINUE taking these medications        Instructions   NIFEdipine SR 30 MG tablet  Commonly known as: Procadia-XL   Take 30 mg by mouth every day. Indications: High Blood Pressure  Disorder  Dose: 30 mg     PRE- FORMULA PO   Take 1 Tablet by mouth every day.  Dose: 1 Tablet              Allergies  Allergies   Allergen Reactions    Sulfa Drugs Hives and Rash     rash       DIET  GI soft, low fat      ACTIVITY  As tolerated.  Weight bearing as tolerated    CONSULTATIONS  [facility], Dr. Michael    PROCEDURES  EGD 23    LABORATORY  Lab Results   Component Value Date    SODIUM 136 2023    POTASSIUM 3.1 (L) 2023    CHLORIDE 105 2023    CO2 21 2023    GLUCOSE 79 2023    BUN <2 (L) 2023    CREATININE 0.38 (L) 2023        Lab Results   Component Value Date    WBC 4.1 (L) 2023    HEMOGLOBIN 9.8 (L) 2023    HEMATOCRIT 30.5 (L) 2023    PLATELETCT 389 2023        Total time of the discharge process exceeds 45 minutes.  More than 50% of time was spent face to face with patient.  This included but not limited to review of hospital course with patient, treatment goals upon discharge, recommendations to PCP, continued and new medications and their adverse reactions, and nursing instructions for patient.        [id number] VIEW   Final Result      1.  Nonobstructive nonspecific bowel gas pattern.      US-RUQ   Final Result      Uniform gastric wall thickening could reflect gastritis/peptic ulcer disease      Small ascites      4 cm cystic collection adjacent to the caudate corresponds to the smaller of the rim-enhancing fluid collections on comparison CT. This could be a pseudocyst or abscess      Lesser sac pseudocyst or abscess on comparison is not visualized sonographically      Echogenic liver, a nonspecific finding that often is found to represent steatosis.  Other infiltrative processes could have an identical appearance      Left lobe liver simple 19 mm cyst      CT-ABDOMEN-PELVIS WITH   Final Result         1.  Fluid collection tracking along the gastric fundus and greater curvature the stomach. Smaller similar appearing fluid  collection is seen near the gastric cardia. Could represent pseudocyst or loculated abdominal abscess.   2.  Slight hazy peripancreatic fat stranding favoring changes of pancreatitis.   3.  Area of low-density in the pancreatic head, could be related to suspected pancreatitis although pancreatic pseudocyst or other pancreatic mass is not excluded.   4.  Gastric wall thickening, appearance suggesting changes of gastritis otherwise indeterminate, could be related to suspected perigastric abscess or pseudocyst.   5.  Scattered abdominal ascites

## 2023-08-20 NOTE — PROGRESS NOTES
Received report from night shift RN. Assumed pt care 0645   Pt is A&Ox4, resting  in bed. Plan of care reviewed for activities and goals this shift. Medication eduction provided and medications administered as prescribed.     Pt verbalizes understanding of plan of care for this shift. Pt on room air; no signs of SOB/respiratory distress.    Pt ambulatory to bathroom and self care - independent. Pt tolerating small amount of breakfast meal.   IVF infusing.   Patients needs attended well. Fall precautions in place. Bed at lowest position. Call light and personal belongings within reach.   Hourly rounding in progress.

## 2023-08-20 NOTE — ASSESSMENT & PLAN NOTE
Pain is from mass effect of 6 pancreatic pseudocyst onto stomach.  - I changed patient's medications to Dilaudid, currently on Dilaudid 2 mg and 4 mg for moderate and severe pain.  Also has IV Dilaudid.  - 4 mg Dilaudid worked better today.  We will continue current pain regimen.

## 2023-08-20 NOTE — PROGRESS NOTES
1940 pt awake rn at bedside  2150 pt sleeping in bed woke easily for vitals to be taken  2335 pt awake rn at bedside  0155 pt sleeping in bed with pulse ox on  0330 pt sleeping with pulse ox on

## 2023-08-22 LAB
BACTERIA BLD CULT: NORMAL
BACTERIA BLD CULT: NORMAL
SIGNIFICANT IND 70042: NORMAL
SIGNIFICANT IND 70042: NORMAL
SITE SITE: NORMAL
SITE SITE: NORMAL
SOURCE SOURCE: NORMAL
SOURCE SOURCE: NORMAL

## 2023-09-25 ENCOUNTER — HOSPITAL ENCOUNTER (OUTPATIENT)
Dept: RADIOLOGY | Facility: MEDICAL CENTER | Age: 43
End: 2023-09-25
Attending: NURSE PRACTITIONER
Payer: COMMERCIAL

## 2023-09-25 DIAGNOSIS — K86.2 CYST AND PSEUDOCYST OF PANCREAS: ICD-10-CM

## 2023-09-25 DIAGNOSIS — K86.3 CYST AND PSEUDOCYST OF PANCREAS: ICD-10-CM

## 2023-09-25 DIAGNOSIS — K85.20 ALCOHOL-INDUCED ACUTE PANCREATITIS, UNSPECIFIED COMPLICATION STATUS: ICD-10-CM

## 2023-09-25 PROCEDURE — 74170 CT ABD WO CNTRST FLWD CNTRST: CPT

## 2023-09-25 PROCEDURE — 700117 HCHG RX CONTRAST REV CODE 255: Performed by: NURSE PRACTITIONER

## 2023-09-25 RX ADMIN — IOHEXOL 100 ML: 350 INJECTION, SOLUTION INTRAVENOUS at 09:30

## 2023-10-19 NOTE — H&P
Subjective:   10/24/2023  7:00 AM  Primary care physician: Pcp Pt States None  Referring Provider: Freddy Miller M.D.    Chief Complaint:   Chief Complaint   Patient presents with    New Patient     Ref Fang  14.2 CM PANC PSEUDOCYST     Diagnosis:   1. Upper abdominal pain        2. Recurrent acute pancreatitis        3. Abnormal findings on diagnostic imaging of abdomen        4. Passive congestion of stomach due to pancreatic pseudocyst mass effect        5. Other ascites        6. 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 She developed waxing and waning upper abdominal pain that started in July but the pain had significantly worsened with nausea and emesis. Her lipase was noted to be 209 and alk phos 101. CT abdomen and pelvis demonstrated fluid collection tracking along the gastric fundus and greater curvature the stomach. Smaller similar appearing fluid collection is seen near the gastric cardia. Could represent pseudocyst or loculated abdominal abscess.  Slight hazy peripancreatic fat stranding favoring changes of pancreatitis. Area of low-density in the pancreatic head, could be related to suspected pancreatitis although pancreatic pseudocyst or other pancreatic mass is not excluded.    She was admitted under the hospitalist and stared on antibiotics for concern of intra-abdominal abscess. RUQ ultrasound demonstrated fluid collection tracking along the gastric fundus and greater curvature the stomach. Smaller similar appearing fluid collection is seen near the gastric cardia. Could represent pseudocyst or loculated abdominal abscess. Slight hazy peripancreatic fat stranding favoring changes of pancreatitis. Area of low-density in the pancreatic head, could be  related to suspected pancreatitis although pancreatic pseudocyst or other pancreatic mass is not excluded.    Dr. Michael was consulted during her hospitalization and underwent an EGD on 8/18/23 that showed stomach mass consistent with extrinsic compression at least 7 x 12 cm. Plan was after 6 weeks if symptomatic and cyst remains then would consider an endoscopic ultrasound with cyst gastrostomy. She was discharged home on 8/20/23.    CT Pancreas and abdomen on 9/25/23 demonstrated a 14.2 cm pseudocyst located between the stomach, pancreatic body/tail and spleen is not not significantly changed since 8/16/2023 study. She also has a stable 1.8 cm hypodense lesion in hepatic segment 4 and moderate to large volume ascites.    She was seen by Dr. Miller on 10/9/23 and discussed the role of a cystogastrostomy stent placement with EUS which would require more than 1 procedure. THus she is being referred to our office for second opinion. Ideally ascitic fluid should be drained prior to management of pseudocyst drainage for best outcome. IR paracentesis has been scheduled at [facility] on 10/20/23 and 2,400 cc output. Pathology is currently pending.    She presents today for surgical discussion regarding her pseudocyst.  In general she is actually feeling quite a bit better since she had her paracentesis.  She is tolerating more p.o. and feels like her abdominal pain has improved significantly and she is able to tolerate more by mouth.  She still having some loose bowel movements but in general they are becoming more formed.  Denies any recurrence of her symptoms.  Has been EtOH free since her episode of pancreatitis.    Past Medical History:   Diagnosis Date    Hypertension      Past Surgical History:   Procedure Laterality Date    RI UPPER GI ENDOSCOPY,DIAGNOSIS N/A 8/18/2023    Procedure: GASTROSCOPY;  Surgeon: Yahir Michael M.D.;  Location: SURGERY [facility];  Service: Gastroenterology     Allergies   Allergen  Reactions    Sulfa Drugs Hives and Rash     rash     Outpatient Encounter Medications as of 10/24/2023   Medication Sig Dispense Refill    Magnesium Glycinate 100 MG Cap Take 1 Capsule by mouth every day with lunch.      NIFEdipine SR (PROCARDIA-XL) 30 MG tablet Take 30 mg by mouth every day. Indications: High Blood Pressure Disorder      Prenatal Multivit-Min-Fe-FA (PRE- FORMULA PO) Take 1 Tablet by mouth every day.      Hyoscyamine Sulfate (GI COCKTAIL, HYOSCYAMINE-LIDOCAINE-MAALOX,) Take 15 mL by mouth 2 times a day as needed (abdominal pain or cramping). 225 mL 1    Pancrelipase, Lip-Prot-Amyl, (PANCREAZE) 9355-8220 units Cap DR Particles Take 1 Capsule by mouth 3 times a day before meals. 90 Capsule 1     No facility-administered encounter medications on file as of 10/24/2023.     Social History     Socioeconomic History    Marital status: Single     Spouse name: Not on file    Number of children: Not on file    Years of education: Not on file    Highest education level: Not on file   Occupational History    Not on file   Tobacco Use    Smoking status: Never    Smokeless tobacco: Never   Vaping Use    Vaping Use: Never used   Substance and Sexual Activity    Alcohol use: Not Currently    Drug use: Never    Sexual activity: Not on file   Other Topics Concern    Not on file   Social History Narrative    Not on file     Social Determinants of Health     Financial Resource Strain: Not on file   Food Insecurity: Not on file   Transportation Needs: Not on file   Physical Activity: Not on file   Stress: Not on file   Social Connections: Not on file   Intimate Partner Violence: Not on file   Housing Stability: Not on file      Social History     Tobacco Use   Smoking Status Never   Smokeless Tobacco Never     Social History     Substance and Sexual Activity   Alcohol Use Not Currently     Social History     Substance and Sexual Activity   Drug Use Never      No family history on file.      Review of Systems  "  Constitutional:  Positive for malaise/fatigue and weight loss. Negative for chills and fever.   HENT:  Negative for congestion, ear discharge, ear pain, hearing loss and nosebleeds.    Eyes:  Negative for blurred vision, double vision, photophobia, pain and discharge.   Respiratory:  Negative for cough, hemoptysis and sputum production.    Cardiovascular:  Negative for chest pain, palpitations, orthopnea and claudication.   Gastrointestinal:  Negative for abdominal pain, constipation, diarrhea, heartburn, nausea and vomiting.   Genitourinary:  Negative for dysuria, frequency, hematuria and urgency.   Musculoskeletal:  Negative for back pain, joint pain, myalgias and neck pain.   Skin:  Negative for itching and rash.   Neurological:  Negative for dizziness, tingling, tremors, sensory change, speech change, focal weakness and headaches.   Endo/Heme/Allergies:  Negative for environmental allergies. Does not bruise/bleed easily.   Psychiatric/Behavioral:  Negative for depression, hallucinations, substance abuse and suicidal ideas. The patient is not nervous/anxious.         Objective:   /76 (BP Location: Right arm, Patient Position: Sitting, BP Cuff Size: Adult)   Pulse 100   Temp 36.9 °C (98.4 °F) (Temporal)   Ht 1.676 m (5' 6\")   Wt 55.5 kg (122 lb 5.7 oz)   SpO2 99%   BMI 19.75 kg/m²     Physical Exam  Constitutional:       General: She is not in acute distress.  HENT:      Head: Normocephalic and atraumatic.   Eyes:      General: No scleral icterus.  Cardiovascular:      Rate and Rhythm: Normal rate and regular rhythm.   Pulmonary:      Effort: No respiratory distress.   Abdominal:      Palpations: Abdomen is soft.      Comments: Mildly tender in the epigastric region.  Small 5 mm umbilical hernia reducible   Musculoskeletal:      Cervical back: Normal range of motion.   Neurological:      Mental Status: She is alert.         Labs   Latest Reference Range & Units 08/18/23 01:19   RBC 4.20 - 5.40 M/uL " 3.27 (L)   Hemoglobin 12.0 - 16.0 g/dL 10.2 (L)   Hematocrit 37.0 - 47.0 % 31.5 (L)   MCV 81.4 - 97.8 fL 96.3   MCH 27.0 - 33.0 pg 31.2   MCHC 32.2 - 35.5 g/dL 32.4   RDW 35.9 - 50.0 fL 42.8   Platelet Count 164 - 446 K/uL 370   (L): Data is abnormally low    Latest Reference Range & Units 08/18/23 01:19   Sodium 135 - 145 mmol/L 136   Potassium 3.6 - 5.5 mmol/L 3.6   Chloride 96 - 112 mmol/L 106   Co2 20 - 33 mmol/L 18 (L)   Anion Gap 7.0 - 16.0  12.0   Glucose 65 - 99 mg/dL 71   Bun 8 - 22 mg/dL 5 (L)   Creatinine 0.50 - 1.40 mg/dL 0.39 (L)   GFR (CKD-EPI) >60 mL/min/1.73 m 2 128   Calcium 8.4 - 10.2 mg/dL 8.4   Correct Calcium 8.5 - 10.5 mg/dL 9.4   AST(SGOT) 12 - 45 U/L 15   ALT(SGPT) 2 - 50 U/L 7   Alkaline Phosphatase 30 - 99 U/L 75   Total Bilirubin 0.1 - 1.5 mg/dL 0.2   Albumin 3.2 - 4.9 g/dL 2.8 (L)   Total Protein 6.0 - 8.2 g/dL 6.1   Globulin 1.9 - 3.5 g/dL 3.3   A-G Ratio g/dL 0.8   Phosphorus 2.5 - 4.5 mg/dL 2.2 (L)   Magnesium 1.5 - 2.5 mg/dL 1.6   Lipase 11 - 82 U/L 112 (H)   (L): Data is abnormally low  (H): Data is abnormally high    Imaging  CT PANCREAS/ABDOMEN (9/25/23)  IMPRESSION:  1. A 14.2 cm pseudocyst located between the stomach, pancreatic body/tail and spleen is not not significantly changed since 8/16/2023 study.  2. Adjacent gastric wall thickening has resolved.  3. No new cystic lesions in the abdomen.  4. Stable 1.8 cm hypodense lesion in hepatic segment 4. While it is not fully characterized on this study, it is stable since August 2023 and is likely benign.  5. Moderate to large volume ascites.    RUQ US (8/16/23)  IMPRESSION:   Uniform gastric wall thickening could reflect gastritis/peptic ulcer disease  Small ascites  4 cm cystic collection adjacent to the caudate corresponds to the smaller of the rim-enhancing fluid collections on comparison CT. This could be a pseudocyst or abscess  Lesser sac pseudocyst or abscess on comparison is not visualized sonographically  Echogenic liver,  a nonspecific finding that often is found to represent steatosis.  Other infiltrative processes could have an identical appearance   Left lobe liver simple 19 mm cyst    CT ABDOMEN/PELVIS (8/16/23)  IMPRESSION:  1.  Fluid collection tracking along the gastric fundus and greater curvature the stomach. Smaller similar appearing fluid collection is seen near the gastric cardia. Could represent pseudocyst or loculated abdominal abscess.  2.  Slight hazy peripancreatic fat stranding favoring changes of pancreatitis.  3.  Area of low-density in the pancreatic head, could be related to suspected pancreatitis although pancreatic pseudocyst or other pancreatic mass is not excluded.  4.  Gastric wall thickening, appearance suggesting changes of gastritis otherwise indeterminate, could be related to suspected perigastric abscess or pseudocyst.  5.  Scattered abdominal ascites    Pathology  Pending    Procedures  Paracentesis (10/20/23) by IR - 2400 mL of fluid withdrawn.    Diagnosis:     1. Upper abdominal pain        2. Recurrent acute pancreatitis        3. Abnormal findings on diagnostic imaging of abdomen        4. Passive congestion of stomach due to pancreatic pseudocyst mass effect        5. Other ascites        6. Pancreatic pseudocyst            Medical Decision Making:  Today's Assessment / Status / Plan:     42-year-old female with a history of alcoholic pancreatitis who had a second bout in August 2023.  On imaging she was noted to have a 14 cm pseudocyst in the body of the pancreas that was compressing her stomach.  Symptomatically she was having decreased p.o. intake, ongoing abdominal pain and fullness.  She underwent a paracentesis and her repeat imaging performed in September showed a stable 14 cm pseudocyst.    An extensive discussion in regards to pancreatic pseudocyst including the causes, the different clinical course these can take and the management options.  Encouragingly her symptoms are improving and  she is tolerating more p.o.  Her last imaging was approximately 1 month ago therefore we will repeat her imaging.  I think if the cyst continues to be as large as it is with compression of the stomach she would benefit from a cyst gastrostomy.  I discussed endoscopic and surgical techniques of this procedure.  I do think that based on the location she potentially be a candidate for endoscopic stent placement into the pseudocyst which she is agreeable to.  We will follow-up on her CT scan and I will do a call to discuss the results with her to plan the next steps in management.    The patient asked several great questions which were answered to their satisfaction.  They  are in agreement with the care plan as outlined above.    I, Camron Patten MD have entered, reviewed and confirmed the above diagnosis related to this patient on this date of service, October 24, 2023 9:04 AM      Camron Patten MD  Surgical Oncology

## 2023-10-20 ENCOUNTER — HOSPITAL ENCOUNTER (OUTPATIENT)
Dept: RADIOLOGY | Facility: MEDICAL CENTER | Age: 43
End: 2023-10-20
Attending: INTERNAL MEDICINE
Payer: COMMERCIAL

## 2023-10-20 DIAGNOSIS — R18.8 OTHER ASCITES: ICD-10-CM

## 2023-10-20 LAB
ALBUMIN FLD-MCNC: 2.3 G/DL
AMYLASE FLD-CCNC: 5277 U/L
APPEARANCE FLD: NORMAL
BASOPHILS NFR FLD: 3 %
BODY FLD TYPE: NORMAL
BODY FLD TYPE: NORMAL
CELLS FLD: 6
COLOR FLD: NORMAL
CSF COMMENTS 1658: NORMAL
CYTOLOGY REG CYTOL: NORMAL
EOSINOPHIL NFR FLD: 4 %
GRAM STN SPEC: NORMAL
LDH FLD L TO P-CCNC: 192 U/L
LYMPHOCYTES NFR FLD: 17 %
MONOS+MACROS NFR FLD MANUAL: 53 %
NEUTROPHILS NFR FLD: 10 %
NUC CELL # FLD: 756 CELLS/UL
PATH REV: NORMAL
PATH REV: NORMAL
PROT FLD-MCNC: 5 G/DL
RBC # FLD: NORMAL CELLS/UL
SIGNIFICANT IND 70042: NORMAL
SITE SITE: NORMAL
SOURCE SOURCE: NORMAL
WBC OTHER NFR FLD: 7 %

## 2023-10-20 PROCEDURE — 82042 OTHER SOURCE ALBUMIN QUAN EA: CPT

## 2023-10-20 PROCEDURE — 87205 SMEAR GRAM STAIN: CPT

## 2023-10-20 PROCEDURE — 80503 PATH CLIN CONSLTJ SF 5-20: CPT

## 2023-10-20 PROCEDURE — 83615 LACTATE (LD) (LDH) ENZYME: CPT

## 2023-10-20 PROCEDURE — 87070 CULTURE OTHR SPECIMN AEROBIC: CPT

## 2023-10-20 PROCEDURE — 84157 ASSAY OF PROTEIN OTHER: CPT

## 2023-10-20 PROCEDURE — 89051 BODY FLUID CELL COUNT: CPT

## 2023-10-20 PROCEDURE — 87015 SPECIMEN INFECT AGNT CONCNTJ: CPT

## 2023-10-20 PROCEDURE — 88112 CYTOPATH CELL ENHANCE TECH: CPT

## 2023-10-20 PROCEDURE — 88305 TISSUE EXAM BY PATHOLOGIST: CPT

## 2023-10-20 PROCEDURE — 82150 ASSAY OF AMYLASE: CPT

## 2023-10-20 PROCEDURE — 49083 ABD PARACENTESIS W/IMAGING: CPT

## 2023-10-20 NOTE — PROGRESS NOTES
US guided therapeutic paracentesis done by Dr. Powell;(no H&P required as this is a NON SEDATION procedure) RLQ access site; dressing CDI; 2400mL obtained and 1400 discarded. Pt tolerated the procedure well; pt hemodynamically stable pre/intra/post procedure. All questions and concerns answered prior to d/c. Patient provided with all appropriate education for procedure. Pt d/c home. Specimens sent to pathology in sterile containers.

## 2023-10-23 LAB
BACTERIA FLD AEROBE CULT: NORMAL
GRAM STN SPEC: NORMAL
SIGNIFICANT IND 70042: NORMAL
SITE SITE: NORMAL
SOURCE SOURCE: NORMAL

## 2023-10-24 ENCOUNTER — OFFICE VISIT (OUTPATIENT)
Dept: SURGICAL ONCOLOGY | Facility: MEDICAL CENTER | Age: 43
End: 2023-10-24
Payer: COMMERCIAL

## 2023-10-24 VITALS
BODY MASS INDEX: 19.66 KG/M2 | TEMPERATURE: 98.4 F | HEART RATE: 100 BPM | WEIGHT: 122.36 LBS | SYSTOLIC BLOOD PRESSURE: 108 MMHG | OXYGEN SATURATION: 99 % | DIASTOLIC BLOOD PRESSURE: 76 MMHG | HEIGHT: 66 IN

## 2023-10-24 DIAGNOSIS — R10.10 UPPER ABDOMINAL PAIN: ICD-10-CM

## 2023-10-24 DIAGNOSIS — R93.5 ABNORMAL FINDINGS ON DIAGNOSTIC IMAGING OF ABDOMEN: ICD-10-CM

## 2023-10-24 DIAGNOSIS — K86.3 PANCREATIC PSEUDOCYST: ICD-10-CM

## 2023-10-24 DIAGNOSIS — K85.90 RECURRENT ACUTE PANCREATITIS: ICD-10-CM

## 2023-10-24 DIAGNOSIS — R18.8 OTHER ASCITES: ICD-10-CM

## 2023-10-24 DIAGNOSIS — K31.89: ICD-10-CM

## 2023-10-24 PROCEDURE — 99204 OFFICE O/P NEW MOD 45 MIN: CPT | Performed by: SURGERY

## 2023-10-24 PROCEDURE — 3078F DIAST BP <80 MM HG: CPT | Performed by: SURGERY

## 2023-10-24 PROCEDURE — 3074F SYST BP LT 130 MM HG: CPT | Performed by: SURGERY

## 2023-10-24 ASSESSMENT — ENCOUNTER SYMPTOMS
NERVOUS/ANXIOUS: 0
VOMITING: 0
NECK PAIN: 0
DIARRHEA: 0
BRUISES/BLEEDS EASILY: 0
PHOTOPHOBIA: 0
TREMORS: 0
HEMOPTYSIS: 0
HEADACHES: 0
NAUSEA: 0
DOUBLE VISION: 0
DEPRESSION: 0
HEARTBURN: 0
FEVER: 0
SPUTUM PRODUCTION: 0
PALPITATIONS: 0
EYE DISCHARGE: 0
CHILLS: 0
MYALGIAS: 0
TINGLING: 0
WEIGHT LOSS: 1
CONSTIPATION: 0
HALLUCINATIONS: 0
EYE PAIN: 0
COUGH: 0
DIZZINESS: 0
SENSORY CHANGE: 0
ORTHOPNEA: 0
CLAUDICATION: 0
ABDOMINAL PAIN: 0
BLURRED VISION: 0
BACK PAIN: 0
SPEECH CHANGE: 0
FOCAL WEAKNESS: 0

## 2023-10-24 ASSESSMENT — FIBROSIS 4 INDEX: FIB4 SCORE: 0.6

## 2023-10-24 ASSESSMENT — LIFESTYLE VARIABLES: SUBSTANCE_ABUSE: 0

## 2023-11-06 ENCOUNTER — APPOINTMENT (OUTPATIENT)
Dept: RADIOLOGY | Facility: MEDICAL CENTER | Age: 43
End: 2023-11-06
Attending: INTERNAL MEDICINE
Payer: COMMERCIAL

## 2023-11-13 ENCOUNTER — HOSPITAL ENCOUNTER (OUTPATIENT)
Dept: RADIOLOGY | Facility: MEDICAL CENTER | Age: 43
End: 2023-11-13
Attending: SURGERY
Payer: COMMERCIAL

## 2023-11-13 DIAGNOSIS — K86.3 PANCREATIC PSEUDOCYST: ICD-10-CM

## 2023-11-13 PROCEDURE — 74170 CT ABD WO CNTRST FLWD CNTRST: CPT

## 2023-11-13 PROCEDURE — 700117 HCHG RX CONTRAST REV CODE 255: Performed by: SURGERY

## 2023-11-13 RX ADMIN — IOHEXOL 100 ML: 350 INJECTION, SOLUTION INTRAVENOUS at 09:28

## 2024-09-13 ENCOUNTER — HOSPITAL ENCOUNTER (INPATIENT)
Facility: MEDICAL CENTER | Age: 44
LOS: 2 days | End: 2024-09-15
Attending: OBSTETRICS & GYNECOLOGY | Admitting: OBSTETRICS & GYNECOLOGY
Payer: COMMERCIAL

## 2024-09-13 ENCOUNTER — ANESTHESIA (OUTPATIENT)
Dept: ANESTHESIOLOGY | Facility: MEDICAL CENTER | Age: 44
End: 2024-09-13
Payer: COMMERCIAL

## 2024-09-13 ENCOUNTER — ANESTHESIA EVENT (OUTPATIENT)
Dept: ANESTHESIOLOGY | Facility: MEDICAL CENTER | Age: 44
End: 2024-09-13
Payer: COMMERCIAL

## 2024-09-13 ENCOUNTER — HOSPITAL ENCOUNTER (OUTPATIENT)
Dept: OBGYN | Facility: MEDICAL CENTER | Age: 44
End: 2024-09-13
Attending: OBSTETRICS & GYNECOLOGY
Payer: COMMERCIAL

## 2024-09-13 LAB
ALBUMIN SERPL BCP-MCNC: 3.1 G/DL (ref 3.2–4.9)
ALBUMIN/GLOB SERPL: 1.3 G/DL
ALP SERPL-CCNC: 110 U/L (ref 30–99)
ALT SERPL-CCNC: 16 U/L (ref 2–50)
ANION GAP SERPL CALC-SCNC: 11 MMOL/L (ref 7–16)
AST SERPL-CCNC: 27 U/L (ref 12–45)
BASOPHILS # BLD AUTO: 0.3 % (ref 0–1.8)
BASOPHILS # BLD: 0.02 K/UL (ref 0–0.12)
BILIRUB SERPL-MCNC: <0.2 MG/DL (ref 0.1–1.5)
BUN SERPL-MCNC: 9 MG/DL (ref 8–22)
CALCIUM ALBUM COR SERPL-MCNC: 8.1 MG/DL (ref 8.5–10.5)
CALCIUM SERPL-MCNC: 7.4 MG/DL (ref 8.5–10.5)
CHLORIDE SERPL-SCNC: 112 MMOL/L (ref 96–112)
CO2 SERPL-SCNC: 15 MMOL/L (ref 20–33)
CREAT SERPL-MCNC: 0.49 MG/DL (ref 0.5–1.4)
EOSINOPHIL # BLD AUTO: 0.06 K/UL (ref 0–0.51)
EOSINOPHIL NFR BLD: 0.8 % (ref 0–6.9)
ERYTHROCYTE [DISTWIDTH] IN BLOOD BY AUTOMATED COUNT: 40.8 FL (ref 35.9–50)
GFR SERPLBLD CREATININE-BSD FMLA CKD-EPI: 121 ML/MIN/1.73 M 2
GLOBULIN SER CALC-MCNC: 2.4 G/DL (ref 1.9–3.5)
GLUCOSE SERPL-MCNC: 75 MG/DL (ref 65–99)
HCT VFR BLD AUTO: 38 % (ref 37–47)
HGB BLD-MCNC: 13.2 G/DL (ref 12–16)
HOLDING TUBE BB 8507: NORMAL
IMM GRANULOCYTES # BLD AUTO: 0.1 K/UL (ref 0–0.11)
IMM GRANULOCYTES NFR BLD AUTO: 1.3 % (ref 0–0.9)
LYMPHOCYTES # BLD AUTO: 1.77 K/UL (ref 1–4.8)
LYMPHOCYTES NFR BLD: 22.2 % (ref 22–41)
MCH RBC QN AUTO: 31.1 PG (ref 27–33)
MCHC RBC AUTO-ENTMCNC: 34.7 G/DL (ref 32.2–35.5)
MCV RBC AUTO: 89.6 FL (ref 81.4–97.8)
MONOCYTES # BLD AUTO: 0.71 K/UL (ref 0–0.85)
MONOCYTES NFR BLD AUTO: 8.9 % (ref 0–13.4)
NEUTROPHILS # BLD AUTO: 5.3 K/UL (ref 1.82–7.42)
NEUTROPHILS NFR BLD: 66.5 % (ref 44–72)
NRBC # BLD AUTO: 0 K/UL
NRBC BLD-RTO: 0 /100 WBC (ref 0–0.2)
PLATELET # BLD AUTO: 184 K/UL (ref 164–446)
PMV BLD AUTO: 10.3 FL (ref 9–12.9)
POTASSIUM SERPL-SCNC: 2.9 MMOL/L (ref 3.6–5.5)
PROT SERPL-MCNC: 5.5 G/DL (ref 6–8.2)
RBC # BLD AUTO: 4.24 M/UL (ref 4.2–5.4)
SODIUM SERPL-SCNC: 138 MMOL/L (ref 135–145)
T PALLIDUM AB SER QL IA: NORMAL
URATE SERPL-MCNC: 4.9 MG/DL (ref 1.9–8.2)
WBC # BLD AUTO: 8 K/UL (ref 4.8–10.8)

## 2024-09-13 PROCEDURE — 304965 HCHG RECOVERY SERVICES

## 2024-09-13 PROCEDURE — 700111 HCHG RX REV CODE 636 W/ 250 OVERRIDE (IP): Performed by: OBSTETRICS & GYNECOLOGY

## 2024-09-13 PROCEDURE — 80053 COMPREHEN METABOLIC PANEL: CPT

## 2024-09-13 PROCEDURE — 36415 COLL VENOUS BLD VENIPUNCTURE: CPT

## 2024-09-13 PROCEDURE — 59409 OBSTETRICAL CARE: CPT

## 2024-09-13 PROCEDURE — A9270 NON-COVERED ITEM OR SERVICE: HCPCS | Performed by: OBSTETRICS & GYNECOLOGY

## 2024-09-13 PROCEDURE — 700111 HCHG RX REV CODE 636 W/ 250 OVERRIDE (IP): Performed by: ANESTHESIOLOGY

## 2024-09-13 PROCEDURE — 84550 ASSAY OF BLOOD/URIC ACID: CPT

## 2024-09-13 PROCEDURE — 700102 HCHG RX REV CODE 250 W/ 637 OVERRIDE(OP): Performed by: OBSTETRICS & GYNECOLOGY

## 2024-09-13 PROCEDURE — 700105 HCHG RX REV CODE 258: Performed by: ANESTHESIOLOGY

## 2024-09-13 PROCEDURE — 700105 HCHG RX REV CODE 258: Performed by: OBSTETRICS & GYNECOLOGY

## 2024-09-13 PROCEDURE — 86780 TREPONEMA PALLIDUM: CPT

## 2024-09-13 PROCEDURE — 10H07YZ INSERTION OF OTHER DEVICE INTO PRODUCTS OF CONCEPTION, VIA NATURAL OR ARTIFICIAL OPENING: ICD-10-PCS | Performed by: OBSTETRICS & GYNECOLOGY

## 2024-09-13 PROCEDURE — 85025 COMPLETE CBC W/AUTO DIFF WBC: CPT

## 2024-09-13 PROCEDURE — 303615 HCHG EPIDURAL/SPINAL ANESTHESIA FOR LABOR

## 2024-09-13 PROCEDURE — 3E0DXGC INTRODUCTION OF OTHER THERAPEUTIC SUBSTANCE INTO MOUTH AND PHARYNX, EXTERNAL APPROACH: ICD-10-PCS | Performed by: OBSTETRICS & GYNECOLOGY

## 2024-09-13 PROCEDURE — 770002 HCHG ROOM/CARE - OB PRIVATE (112)

## 2024-09-13 PROCEDURE — 0HQ9XZZ REPAIR PERINEUM SKIN, EXTERNAL APPROACH: ICD-10-PCS | Performed by: OBSTETRICS & GYNECOLOGY

## 2024-09-13 RX ORDER — BUPIVACAINE HYDROCHLORIDE 2.5 MG/ML
INJECTION, SOLUTION EPIDURAL; INFILTRATION; INTRACAUDAL PRN
Status: DISCONTINUED | OUTPATIENT
Start: 2024-09-13 | End: 2024-09-13 | Stop reason: SURG

## 2024-09-13 RX ORDER — DEXTROSE, SODIUM CHLORIDE, SODIUM LACTATE, POTASSIUM CHLORIDE, AND CALCIUM CHLORIDE 5; .6; .31; .03; .02 G/100ML; G/100ML; G/100ML; G/100ML; G/100ML
INJECTION, SOLUTION INTRAVENOUS CONTINUOUS
Status: DISCONTINUED | OUTPATIENT
Start: 2024-09-13 | End: 2024-09-15 | Stop reason: HOSPADM

## 2024-09-13 RX ORDER — CARBOPROST TROMETHAMINE 250 UG/ML
250 INJECTION, SOLUTION INTRAMUSCULAR
Status: DISCONTINUED | OUTPATIENT
Start: 2024-09-13 | End: 2024-09-13 | Stop reason: HOSPADM

## 2024-09-13 RX ORDER — DOCUSATE SODIUM 100 MG/1
100 CAPSULE, LIQUID FILLED ORAL 2 TIMES DAILY PRN
Status: DISCONTINUED | OUTPATIENT
Start: 2024-09-13 | End: 2024-09-15 | Stop reason: HOSPADM

## 2024-09-13 RX ORDER — SODIUM CHLORIDE, SODIUM LACTATE, POTASSIUM CHLORIDE, AND CALCIUM CHLORIDE .6; .31; .03; .02 G/100ML; G/100ML; G/100ML; G/100ML
250 INJECTION, SOLUTION INTRAVENOUS PRN
Status: DISCONTINUED | OUTPATIENT
Start: 2024-09-13 | End: 2024-09-13 | Stop reason: HOSPADM

## 2024-09-13 RX ORDER — MISOPROSTOL 200 UG/1
800 TABLET ORAL
Status: COMPLETED | OUTPATIENT
Start: 2024-09-13 | End: 2024-09-13

## 2024-09-13 RX ORDER — NIFEDIPINE 30 MG/1
30 TABLET, EXTENDED RELEASE ORAL
Status: DISCONTINUED | OUTPATIENT
Start: 2024-09-14 | End: 2024-09-15 | Stop reason: HOSPADM

## 2024-09-13 RX ORDER — SODIUM CHLORIDE, SODIUM LACTATE, POTASSIUM CHLORIDE, CALCIUM CHLORIDE 600; 310; 30; 20 MG/100ML; MG/100ML; MG/100ML; MG/100ML
INJECTION, SOLUTION INTRAVENOUS PRN
Status: DISCONTINUED | OUTPATIENT
Start: 2024-09-13 | End: 2024-09-15 | Stop reason: HOSPADM

## 2024-09-13 RX ORDER — EPHEDRINE SULFATE 50 MG/ML
5 INJECTION, SOLUTION INTRAVENOUS
Status: DISCONTINUED | OUTPATIENT
Start: 2024-09-13 | End: 2024-09-13 | Stop reason: HOSPADM

## 2024-09-13 RX ORDER — METHYLERGONOVINE MALEATE 0.2 MG/ML
0.2 INJECTION INTRAVENOUS
Status: DISCONTINUED | OUTPATIENT
Start: 2024-09-13 | End: 2024-09-13 | Stop reason: HOSPADM

## 2024-09-13 RX ORDER — OXYCODONE HYDROCHLORIDE 5 MG/1
5 TABLET ORAL EVERY 4 HOURS PRN
Status: DISCONTINUED | OUTPATIENT
Start: 2024-09-13 | End: 2024-09-15 | Stop reason: HOSPADM

## 2024-09-13 RX ORDER — ONDANSETRON 4 MG/1
4 TABLET, ORALLY DISINTEGRATING ORAL EVERY 6 HOURS PRN
Status: DISCONTINUED | OUTPATIENT
Start: 2024-09-13 | End: 2024-09-13 | Stop reason: HOSPADM

## 2024-09-13 RX ORDER — ACETAMINOPHEN 500 MG
1000 TABLET ORAL EVERY 6 HOURS PRN
Status: DISCONTINUED | OUTPATIENT
Start: 2024-09-13 | End: 2024-09-15 | Stop reason: HOSPADM

## 2024-09-13 RX ORDER — IBUPROFEN 800 MG/1
800 TABLET, FILM COATED ORAL
Status: DISCONTINUED | OUTPATIENT
Start: 2024-09-13 | End: 2024-09-13 | Stop reason: HOSPADM

## 2024-09-13 RX ORDER — TERBUTALINE SULFATE 1 MG/ML
0.25 INJECTION, SOLUTION SUBCUTANEOUS
Status: DISCONTINUED | OUTPATIENT
Start: 2024-09-13 | End: 2024-09-13 | Stop reason: HOSPADM

## 2024-09-13 RX ORDER — ONDANSETRON 2 MG/ML
4 INJECTION INTRAMUSCULAR; INTRAVENOUS EVERY 6 HOURS PRN
Status: DISCONTINUED | OUTPATIENT
Start: 2024-09-13 | End: 2024-09-13 | Stop reason: HOSPADM

## 2024-09-13 RX ORDER — ROPIVACAINE HYDROCHLORIDE 2 MG/ML
INJECTION, SOLUTION EPIDURAL; INFILTRATION; PERINEURAL CONTINUOUS
Status: DISCONTINUED | OUTPATIENT
Start: 2024-09-13 | End: 2024-09-15 | Stop reason: HOSPADM

## 2024-09-13 RX ORDER — ACETAMINOPHEN 500 MG
1000 TABLET ORAL
Status: DISCONTINUED | OUTPATIENT
Start: 2024-09-13 | End: 2024-09-13 | Stop reason: HOSPADM

## 2024-09-13 RX ORDER — LIDOCAINE HYDROCHLORIDE 10 MG/ML
20 INJECTION, SOLUTION INFILTRATION; PERINEURAL
Status: DISCONTINUED | OUTPATIENT
Start: 2024-09-13 | End: 2024-09-13 | Stop reason: HOSPADM

## 2024-09-13 RX ORDER — SODIUM CHLORIDE, SODIUM LACTATE, POTASSIUM CHLORIDE, AND CALCIUM CHLORIDE .6; .31; .03; .02 G/100ML; G/100ML; G/100ML; G/100ML
1000 INJECTION, SOLUTION INTRAVENOUS
Status: COMPLETED | OUTPATIENT
Start: 2024-09-13 | End: 2024-09-13

## 2024-09-13 RX ORDER — OXYTOCIN 10 [USP'U]/ML
10 INJECTION, SOLUTION INTRAMUSCULAR; INTRAVENOUS
Status: DISCONTINUED | OUTPATIENT
Start: 2024-09-13 | End: 2024-09-13 | Stop reason: HOSPADM

## 2024-09-13 RX ORDER — OXYCODONE HYDROCHLORIDE 5 MG/1
5 TABLET ORAL
Status: DISCONTINUED | OUTPATIENT
Start: 2024-09-13 | End: 2024-09-13 | Stop reason: HOSPADM

## 2024-09-13 RX ORDER — BUPIVACAINE HYDROCHLORIDE 2.5 MG/ML
INJECTION, SOLUTION EPIDURAL; INFILTRATION; INTRACAUDAL
Status: COMPLETED
Start: 2024-09-13 | End: 2024-09-13

## 2024-09-13 RX ORDER — IBUPROFEN 800 MG/1
800 TABLET, FILM COATED ORAL EVERY 8 HOURS PRN
Status: DISCONTINUED | OUTPATIENT
Start: 2024-09-13 | End: 2024-09-15 | Stop reason: HOSPADM

## 2024-09-13 RX ORDER — SODIUM CHLORIDE, SODIUM LACTATE, POTASSIUM CHLORIDE, CALCIUM CHLORIDE 600; 310; 30; 20 MG/100ML; MG/100ML; MG/100ML; MG/100ML
1000 INJECTION, SOLUTION INTRAVENOUS CONTINUOUS
Status: ACTIVE | OUTPATIENT
Start: 2024-09-13 | End: 2024-09-13

## 2024-09-13 RX ADMIN — MISOPROSTOL 1000 MCG: 200 TABLET ORAL at 20:19

## 2024-09-13 RX ADMIN — OXYTOCIN 2 MILLI-UNITS/MIN: 10 INJECTION, SOLUTION INTRAMUSCULAR; INTRAVENOUS at 12:55

## 2024-09-13 RX ADMIN — SODIUM CHLORIDE, POTASSIUM CHLORIDE, SODIUM LACTATE AND CALCIUM CHLORIDE 1000 ML: 600; 310; 30; 20 INJECTION, SOLUTION INTRAVENOUS at 12:53

## 2024-09-13 RX ADMIN — MISOPROSTOL 25 MCG: 100 TABLET ORAL at 08:48

## 2024-09-13 RX ADMIN — BUPIVACAINE HYDROCHLORIDE 3 ML: 2.5 INJECTION, SOLUTION EPIDURAL; INFILTRATION; INTRACAUDAL at 14:04

## 2024-09-13 RX ADMIN — BUPIVACAINE HYDROCHLORIDE 3 ML: 2.5 INJECTION, SOLUTION EPIDURAL; INFILTRATION; INTRACAUDAL at 13:59

## 2024-09-13 RX ADMIN — SODIUM CHLORIDE, POTASSIUM CHLORIDE, SODIUM LACTATE AND CALCIUM CHLORIDE 1000 ML: 600; 310; 30; 20 INJECTION, SOLUTION INTRAVENOUS at 14:09

## 2024-09-13 RX ADMIN — ROPIVACAINE HYDROCHLORIDE: 2 INJECTION EPIDURAL; INFILTRATION; PERINEURAL at 14:14

## 2024-09-13 ASSESSMENT — PAIN SCALES - GENERAL
PAIN_LEVEL: 0
PAINLEVEL: 0 - NO PAIN

## 2024-09-13 ASSESSMENT — LIFESTYLE VARIABLES
EVER HAD A DRINK FIRST THING IN THE MORNING TO STEADY YOUR NERVES TO GET RID OF A HANGOVER: NO
ALCOHOL_USE: NO
HAVE YOU EVER FELT YOU SHOULD CUT DOWN ON YOUR DRINKING: NO
TOTAL SCORE: 0
EVER FELT BAD OR GUILTY ABOUT YOUR DRINKING: NO
CONSUMPTION TOTAL: INCOMPLETE
TOTAL SCORE: 0
EVER_SMOKED: NEVER
HAVE PEOPLE ANNOYED YOU BY CRITICIZING YOUR DRINKING: NO
TOTAL SCORE: 0

## 2024-09-13 ASSESSMENT — FIBROSIS 4 INDEX: FIB4 SCORE: 0.61

## 2024-09-13 NOTE — PROGRESS NOTES
"OB PN    Feels some comtractions but mostly comfortable  Vitals:    09/13/24 0412 09/13/24 0500 09/13/24 0845   BP:  120/74 124/80   Pulse:  91 79   Resp:  16 17   Temp:  36.5 °C (97.7 °F) 36.6 °C (97.8 °F)   TempSrc:  Temporal Temporal   SpO2:  97%    Weight: 85.3 kg (188 lb)     Height: 1.676 m (5' 5.98\")        SVE 1/50/-3, balloon placed 60/30cc  EFm- cat 1  Will follow    "

## 2024-09-13 NOTE — PROGRESS NOTES
0700-report received, care assumed. POC discussed with pt. Pt denies any needs at this time. Pt passionate about delivering today and wants to talk to provider about POC.   0821-Dr Méndez at bedside. POC discussed.   0848-SVE 1/40/-3. Cytotec placed by MD.  1226-Dr Méndez at bedside. SVE 1/50/-3. Balloon placed U60/V30 ml. Pt tolerated well and denies any needs at this time  1345-Pt called out, desires epidural. Pt prepped.   1353-Dr Garza at bedside. Epidural discussed with pt. Pt denies any questions and consents to epidural. Pt repositioned for epidural placement  1408-Epidural complete. Pt repositioned for comfort.  1415-Gonzalez placed. Balloon expelled. SVE 5/70/-3. Dr Méndez updated. Pt denies any needs at this time.   1749-pt called out felt gush. SROM clear fluid.SVE 6/70/-2. Dr Méndez updated  1828-Pt feeling more pressure . SVE 9/90/0. Room set up for delivery. Dr Méndez updated.   1855-Report given to Roberta OTERO

## 2024-09-13 NOTE — H&P
DATE OF ADMISSION:  2024     HISTORY OF PRESENT ILLNESS:  This is a 43-year-old -0-0-2, who has an   estimated due date of  based on a 6 week and 3 day ultrasound.  The   patient has a history of chronic hypertension and is taking nifedipine.  This   is indication for induction of labor.  She is without complaints.  She reports   good fetal movement, no contractions, vaginal bleeding or loss of fluid.     REVIEW OF SYSTEMS:  Negative.     PAST MEDICAL HISTORY:  Hypertension.     PAST SURGICAL HISTORY:  None.     MEDICATIONS:  Nifedipine 30 mg XL every 24 hours.     ALLERGIES:  SULFA.     SOCIAL HISTORY:  Denies tobacco, ethanol, or drugs.     PREVIOUS OBSTETRICAL HISTORY:   x2, both babies weighing about 6.5-7   pounds.     PHYSICAL EXAMINATION:  VITAL SIGNS:  Blood pressure 126/80, weight 188 pounds.  HEART:  Regular rate and rhythm.  LUNGS:  Clear.  ABDOMEN:  Gravid and soft.  EXTREMITIES:  Negative clubbing, cyanosis or edema.  PELVIC:  Most recent cervical exam 1-2 cm thick, -2 station, vertex   presentation.     PERTINENT PRENATAL LABORATORY DATA:  Blood type A positive, rubella immune,   RPR, HIV, hepatitis nonreactive, group B strep negative.     ASSESSMENT AND PLAN: This is a 43-year-old  who will be 38 weeks and 0   days for induction of labor secondary to chronic hypertension, on nifedipine   30 mg XL. The patient has had good prenatal care.  She has [facility]   [facility].  Most recent   ultrasound shows fetus in vertex presentation, estimated fetal weight 7 pounds   10 ounces.  Of note, the baby does have a membranous VSD and will need   cardiac echo following delivery.  She had NIPT testing done, which shows a low   risk male.        ______________________________  MD KENYON Rivero/JEET/BEAU    DD:  2024 13:28  DT:  2024 14:22    Job#:  [id number]

## 2024-09-13 NOTE — PROGRESS NOTES
OBPN    42yo  at 38w here for IOL for CHTN and AMA, GBS negative    SVE /-3  EFM- reactive NST  Mazie-irreg contraction    Cytotec placed  Will follow.

## 2024-09-13 NOTE — PROGRESS NOTES
0412- [facility] 2024 38.0 weeks presents to L&D for IOL for AMA and GHTN.  TOCO/FM applied  IV started, labs drawn and admit profile completed.  SVE=/-3  0500- Working in department and updated on pt. Will talk with pt about balloon for induction.  2967-Pt agreeable to balloon.

## 2024-09-13 NOTE — ANESTHESIA PROCEDURE NOTES
Epidural Block    Date/Time: 9/13/2024 1:52 PM    Performed by: German Garza M.D.  Authorized by: German Garza M.D.    Patient Location:  OB  Start Time:  9/13/2024 1:52 PM  End Time:  9/13/2024 1:59 PM  Reason for Block: labor analgesia    patient identified, IV checked, site marked, risks and benefits discussed, surgical consent, monitors and equipment checked and pre-op evaluation    Patient Position:  Sitting  Prep: ChloraPrep, patient draped and sterile technique    Monitoring:  Blood pressure, continuous pulse oximetry and heart rate  Approach:  Midline  Location:  L2-L3  Injection Technique:  SADAF saline  Skin infiltration:  Lidocaine  Strength:  1%  Dose:  3ml  Needle Type:  Tuohy  Needle Gauge:  17 G  Needle Length:  3.5 in  Loss of resistance::  4.5  Catheter Size:  19 G  Catheter at Skin Depth:  11  Test Dose Result:  Negative   Success on 3rd pass at same level  No heme/CSF  Catheter threaded easily  Negative aspiration  No evidence of complications  Patient comfortable after loading dose

## 2024-09-13 NOTE — ANESTHESIA PREPROCEDURE EVALUATION
Date: 24  Procedure: Labor Epidural        espinosa pregnancy at 38 weeks gestation.    Relevant Problems   CARDIAC   (positive) HTN (hypertension)       Physical Exam    Airway   Mallampati: II  TM distance: >3 FB  Neck ROM: full       Cardiovascular - normal exam  Rhythm: regular  Rate: normal  (-) murmur     Dental - normal exam           Pulmonary - normal exam  Breath sounds clear to auscultation     Abdominal    Neurological - normal exam                   Anesthesia Plan    ASA 2       Plan - epidural   Neuraxial block will be labor analgesia                  Pertinent diagnostic labs and testing reviewed    Informed Consent:    Anesthetic plan and risks discussed with patient.

## 2024-09-14 LAB
ERYTHROCYTE [DISTWIDTH] IN BLOOD BY AUTOMATED COUNT: 41.2 FL (ref 35.9–50)
HCT VFR BLD AUTO: 37.2 % (ref 37–47)
HGB BLD-MCNC: 13 G/DL (ref 12–16)
MCH RBC QN AUTO: 31.5 PG (ref 27–33)
MCHC RBC AUTO-ENTMCNC: 34.9 G/DL (ref 32.2–35.5)
MCV RBC AUTO: 90.1 FL (ref 81.4–97.8)
PLATELET # BLD AUTO: 182 K/UL (ref 164–446)
PMV BLD AUTO: 10.1 FL (ref 9–12.9)
RBC # BLD AUTO: 4.13 M/UL (ref 4.2–5.4)
WBC # BLD AUTO: 11.1 K/UL (ref 4.8–10.8)

## 2024-09-14 PROCEDURE — 36415 COLL VENOUS BLD VENIPUNCTURE: CPT

## 2024-09-14 PROCEDURE — 770002 HCHG ROOM/CARE - OB PRIVATE (112)

## 2024-09-14 PROCEDURE — 700102 HCHG RX REV CODE 250 W/ 637 OVERRIDE(OP): Performed by: OBSTETRICS & GYNECOLOGY

## 2024-09-14 PROCEDURE — A9270 NON-COVERED ITEM OR SERVICE: HCPCS | Performed by: OBSTETRICS & GYNECOLOGY

## 2024-09-14 PROCEDURE — 85027 COMPLETE CBC AUTOMATED: CPT

## 2024-09-14 RX ADMIN — ACETAMINOPHEN 1000 MG: 500 TABLET ORAL at 12:54

## 2024-09-14 RX ADMIN — IBUPROFEN 800 MG: 800 TABLET, FILM COATED ORAL at 07:26

## 2024-09-14 RX ADMIN — IBUPROFEN 800 MG: 800 TABLET, FILM COATED ORAL at 16:38

## 2024-09-14 RX ADMIN — DOCUSATE SODIUM 100 MG: 100 CAPSULE, LIQUID FILLED ORAL at 12:54

## 2024-09-14 ASSESSMENT — PAIN DESCRIPTION - PAIN TYPE
TYPE: ACUTE PAIN

## 2024-09-14 NOTE — PROGRESS NOTES
1900-Rcvd report from dayshift RN and assumed care of pt.  1950-SVE=complete  - of viable baby boy with 8/9 apgars  2100-IV infiltrated. Dr. Méndez made aware and OK to keep IV out as long as bleeding is good.  -Pt up to bathroom to void and isaias area cleaned. Pt transferred to PPU via wheelchair in stable condition with baby and FOB. Report given to Lis OTERO.

## 2024-09-14 NOTE — ANESTHESIA POSTPROCEDURE EVALUATION
Patient: Megan Nicole    Procedure Summary       Date: 09/13/24 Room / Location:     Anesthesia Start: 1352 Anesthesia Stop: 2009    Procedure: Labor Epidural Diagnosis:     Scheduled Providers:  Responsible Provider: German Garza M.D.    Anesthesia Type: epidural ASA Status: 2            Final Anesthesia Type: epidural  Last vitals  BP   Blood Pressure: 109/69    Temp   36.5 °C (97.7 °F)    Pulse   77   Resp   17    SpO2   96 %      Anesthesia Post Evaluation    Patient location during evaluation: floor  Patient participation: complete - patient participated  Level of consciousness: awake and alert  Pain score: 0    Airway patency: patent  Anesthetic complications: no  Cardiovascular status: hemodynamically stable  Respiratory status: acceptable  Hydration status: euvolemic    PONV: none          No notable events documented.     Nurse Pain Score: 0 (NPRS)

## 2024-09-14 NOTE — CARE PLAN
Problem: Altered physiologic condition related to immediate post-delivery state and potential for bleeding/hemorrhage  Goal: Patient physiologically stable as evidenced by normal lochia, palpable uterine involution and vital signs within normal limits  Outcome: Progressing     Problem: Alteration in comfort related to episiotomy, vaginal repair and/or after birth pains  Goal: Patient verbalizes acceptable pain level  Outcome: Progressing   The patient is Stable - Low risk of patient condition declining or worsening    Shift Goals: pain controlled, rest  Clinical Goals: maintain normal lochia, pain management  Patient Goals: pain controlled, rest  Family Goals: support    Progress made toward(s) clinical / shift goals:  pt verbalized acceptable level of pain      Patient is not progressing towards the following goals:

## 2024-09-14 NOTE — PROGRESS NOTES
2245 Admitted from L and D via wheelchair, Pt oriented to the room, Safety precaution and plan of care discussed, Assessment done fundus firm with scant lochia, abdomen soft non distended, Denies pain at this time, Admission care rendered.

## 2024-09-14 NOTE — ANESTHESIA TIME REPORT
Anesthesia Start and Stop Event Times       Date Time Event    9/13/2024 1347 Ready for Procedure     1352 Anesthesia Start     2009 Anesthesia Stop          Responsible Staff  09/13/24      Name Role Begin End    German Garza M.D. Anesth 1352 2009          Overtime Reason:  no overtime (within assigned shift)    Comments:

## 2024-09-14 NOTE — PROGRESS NOTES
Obstetrics  Postpartum Progress Note    Events  No acute events     Subjective:  Doing well. Reports pain is controlled. Lochia minimal. Ambulating. Voiding without difficulty. + Flatus. No bowel movement. Denies headaches or changes in vision. Breastfeeding.     PHYSICAL EXAM:  Vitals:   Vitals:    24 2311 24 0200 24 0546   BP: 109/69 127/82 116/76 110/82   Pulse: 77 89 72 77   Resp:  18 18 18   Temp:  37.2 °C (98.9 °F) 36.3 °C (97.3 °F) 36.8 °C (98.2 °F)   TempSrc:  Temporal Temporal Temporal   SpO2:  96% 100% 97%   Weight:       Height:          General: Alert, conversational, pleasant, no acute distress.  CVS: Regular rate.  PULM: No respiratory distress, symmetric expansion.  ABD: Soft, non-tender, non-distended, fundus firm, non-tender, below the umbilicus. Incision dressing clean and dry. No surrounding erythema or drainage.   : Deferred  Extremities: Moves all, trace edema.     Labs Reviewed and Significant for:  Recent Labs     24  0430   WBC 8.0   RBC 4.24   HEMOGLOBIN 13.2   HEMATOCRIT 38.0   MCV 89.6   MCH 31.1   RDW 40.8   PLATELETCT 184   MPV 10.3   NEUTSPOLYS 66.50   LYMPHOCYTES 22.20   MONOCYTES 8.90   EOSINOPHILS 0.80   BASOPHILS 0.30        Assessment and Plan:    Megan Nicole is a 43 y.o.  postpartum day  s/p Vaginal, Spontaneous at 38w1d,  cc    ASSESSMENT:  Postpartum state  Chrtn-usually takes procardia 30 xl daily, held this am due to low bp  AMa    PLAN:  - Continue routine postpartum care.   - Anticipate home in am    Antonette Méndez M.D.

## 2024-09-14 NOTE — LACTATION NOTE
This note was copied from a baby's chart.  [place] Mom has had a good BF experience with her first 2 kids and was able to BF for over a year with good supply. She is calm and confident with her baby. This baby has been able to latch and BF, but is sleepy now. Provided spoons and worked on HE. Mom is able to express colostrum from each side. Attempted to latch on the right side in cross cradle hold. Baby will latch but quickly falls back asleep. L-7. Taught to keep baby STS to allow for BF ad hamida per his feeding cues, wake to feed if greater than 2 hours during the day, or 3-4 hours during the night, since previous BF. He is supposed to be circumcised tomorrow and may be sleepy again. Mom should continue to do frequent HE and spoon feeding back to baby if he is sleepy. Taught supply and demand, frequent emptying of both breasts to initiate/maintain her supply without issues of mastitis. She should see her supply increase in about 72 hours. Provided written educational materials. Encouraged to call for any additional LC assistance as needed.

## 2024-09-14 NOTE — L&D DELIVERY NOTE
DATE OF SERVICE:  2024     This is a 43-year-old -0-0-2 at 38 weeks' gestation.  She is a patient of   Dr. James.  Her pregnancy is complicated by advanced maternal age and   chronic hypertension.  She was admitted early this morning for induction of   labor.  Her GBS was noted to be negative.  Her cervix was unfavorable at 1 cm.    She received one dose of Cytotec, followed by placement of Cook's balloon.    This came out a couple hours after it was placed, and she was 5 cm dilated,   requested an epidural.  She spontaneously ruptured on her own and with Pitocin   made it to complete dilation and pushed for less than 10 minutes to go on to   deliver a male infant from OA presentation.  Delivery of the head was manual   assisted. Shoulders and the rest of body followed without difficulty.  Baby   was placed on mom's abdomen with a vigorous cry and excellent tone.  Mouth was   bulb suctioned.  Cord was doubly clamped and cut at approximately 1 minute by   the father of the baby.  Placenta delivered spontaneously and intact.    Three-vessel cord was noted.  First-degree midline laceration was repaired in   the usual fashion using 3-0 Vicryl.  Sponge and needle counts were correct.    There was some mild uterine atony initially that improved with IV Pitocin and   bimanual massage.  I did place 1000 mcg of Cytotec prophylactically.  Total EBL   400 mL.  Mom and baby are currently doing well.        ______________________________  MD CHARLOTTE Nicholson/DARIO    DD:  2024 20:40  DT:  2024 20:48    Job#:  [id number]

## 2024-09-15 ENCOUNTER — PHARMACY VISIT (OUTPATIENT)
Dept: PHARMACY | Facility: MEDICAL CENTER | Age: 44
End: 2024-09-15
Payer: COMMERCIAL

## 2024-09-15 VITALS
BODY MASS INDEX: 30.22 KG/M2 | HEIGHT: 66 IN | DIASTOLIC BLOOD PRESSURE: 85 MMHG | WEIGHT: 188 LBS | TEMPERATURE: 97.3 F | SYSTOLIC BLOOD PRESSURE: 128 MMHG | HEART RATE: 88 BPM | OXYGEN SATURATION: 94 % | RESPIRATION RATE: 18 BRPM

## 2024-09-15 PROCEDURE — RXMED WILLOW AMBULATORY MEDICATION CHARGE: Performed by: OBSTETRICS & GYNECOLOGY

## 2024-09-15 PROCEDURE — 700102 HCHG RX REV CODE 250 W/ 637 OVERRIDE(OP): Performed by: OBSTETRICS & GYNECOLOGY

## 2024-09-15 PROCEDURE — A9270 NON-COVERED ITEM OR SERVICE: HCPCS | Performed by: OBSTETRICS & GYNECOLOGY

## 2024-09-15 RX ORDER — IBUPROFEN 800 MG/1
800 TABLET, FILM COATED ORAL EVERY 8 HOURS PRN
Qty: 30 TABLET | Refills: 1 | Status: SHIPPED | OUTPATIENT
Start: 2024-09-15

## 2024-09-15 RX ADMIN — NIFEDIPINE 30 MG: 30 TABLET, EXTENDED RELEASE ORAL at 05:48

## 2024-09-15 RX ADMIN — OXYCODONE HYDROCHLORIDE 5 MG: 5 TABLET ORAL at 07:12

## 2024-09-15 RX ADMIN — ACETAMINOPHEN 1000 MG: 500 TABLET ORAL at 05:48

## 2024-09-15 RX ADMIN — IBUPROFEN 800 MG: 800 TABLET, FILM COATED ORAL at 05:53

## 2024-09-15 ASSESSMENT — EDINBURGH POSTNATAL DEPRESSION SCALE (EPDS)
I HAVE BEEN SO UNHAPPY THAT I HAVE BEEN CRYING: NO, NEVER
I HAVE BEEN SO UNHAPPY THAT I HAVE HAD DIFFICULTY SLEEPING: NOT AT ALL
I HAVE FELT SCARED OR PANICKY FOR NO GOOD REASON: NO, NOT AT ALL
THE THOUGHT OF HARMING MYSELF HAS OCCURRED TO ME: NEVER
I HAVE FELT SAD OR MISERABLE: NO, NOT AT ALL
I HAVE BLAMED MYSELF UNNECESSARILY WHEN THINGS WENT WRONG: NOT VERY OFTEN
I HAVE BEEN ANXIOUS OR WORRIED FOR NO GOOD REASON: NO, NOT AT ALL
THINGS HAVE BEEN GETTING ON TOP OF ME: NO, I HAVE BEEN COPING AS WELL AS EVER
I HAVE LOOKED FORWARD WITH ENJOYMENT TO THINGS: AS MUCH AS I EVER DID
I HAVE BEEN ABLE TO LAUGH AND SEE THE FUNNY SIDE OF THINGS: AS MUCH AS I ALWAYS COULD

## 2024-09-15 ASSESSMENT — PAIN DESCRIPTION - PAIN TYPE
TYPE: SURGICAL PAIN
TYPE: ACUTE PAIN

## 2024-09-15 NOTE — CARE PLAN
Problem: Altered physiologic condition related to immediate post-delivery state and potential for bleeding/hemorrhage  Goal: Patient physiologically stable as evidenced by normal lochia, palpable uterine involution and vital signs within normal limits  Outcome: Progressing     Problem: Pain - Standard  Goal: Alleviation of pain or a reduction in pain to the patient’s comfort goal  Outcome: Progressing   The patient is Stable - Low risk of patient condition declining or worsening    Shift Goals  Clinical Goals: stable VS and lochia WNL  Patient Goals: rest and care for baby  Family Goals: support    Progress made toward(s) clinical / shift goals:    Pt reports comfort after pain interventions, Fundus firm and lochia light, VSS, educated on POC, needs met at this time. Questions answered. Will continue to educate.

## 2024-09-15 NOTE — PROGRESS NOTES
Bedside report done, patient in bed with FOB at bedside, denies pain at this time. Plan of care discussed.

## 2024-09-15 NOTE — PROGRESS NOTES
1145- Discharge education provided and signed. All printed topics discussed. Emphasis provided on feeding plan, bleeding, and when to call doctor. follow up appointments discussed. All questions answered. No further needs at this time.     1155 Escorted off floor with staff.

## 2024-09-15 NOTE — PROGRESS NOTES
Hospital Day : 2    S: DOing well; kinjal diet; min bleed; no sx pih    O:  Vitals:    24 1847 24 2200 09/15/24 0200 09/15/24 0541   BP: 127/80 120/74 115/69 121/76   Pulse: 77 85 82 64   Resp:  18 18 18   Temp:  36.9 °C (98.4 °F) 36.6 °C (97.8 °F) 36.3 °C (97.3 °F)   TempSrc:  Temporal Temporal Temporal   SpO2:  96% 95% 98%   Weight:       Height:           Recent Labs     24  0430 24  0736   WBC 8.0 11.1*   RBC 4.24 4.13*   HEMOGLOBIN 13.2 13.0   HEMATOCRIT 38.0 37.2   MCV 89.6 90.1   MCH 31.1 31.5   MCHC 34.7 34.9   RDW 40.8 41.2   PLATELETCT 184 182   MPV 10.3 10.1     Recent Labs     24  0505   SODIUM 138   POTASSIUM 2.9*   CHLORIDE 112   CO2 15*   GLUCOSE 75   BUN 9   CREATININE 0.49*   CALCIUM 7.4*         Abd soft ff    A: pp2 sp ; chronic htn; doing well    P: dc

## 2024-09-15 NOTE — DISCHARGE SUMMARY
DATE OF ADMISSION:  09/13/2024   DATE OF DISCHARGE:  09/15/2024     OB/GYN DISCHARGE SUMMARY     ADMITTING DIAGNOSES:  1.  Pregnancy at 38 weeks with chronic hypertension.  2.  Beta strep negative.     DISCHARGE DIAGNOSES:  1.  Pregnancy at 38 weeks with chronic hypertension.  2.  Beta strep negative.  3.  Status post normal spontaneous vaginal delivery.     HOSPITAL COURSE IN DETAIL:  This patient was admitted on the aforementioned   day with the aforementioned diagnoses.  Cytotec induction was undertaken.    Progressed over normal labor curve and eventually delivered on 09/13.  Please   refer to Dr. Méndez's delivery note for pertinent details.  On postpartum   day #1, she is doing well without complaint.  Today postpartum day #2, she   desires discharge home.     PHYSICAL EXAMINATION:  VITAL SIGNS:  She is afebrile.  Her vital signs are within normal limits.  ABDOMEN:  Soft with a full fundus below the umbilicus.     ASSESSMENT:  At this time, postpartum day #2, status post normal spontaneous   vaginal delivery with chronic hypertension, doing well, desires discharge   home.     PLAN:  At this time:  1.  Discharge home.  2.  Follow up in 6 weeks.  3.  Pelvic rest.  4.  Lift precautions.  5.  Scripts for Motrin consented and written.  6.  We will continue nifedipine 30 XL daily.        ______________________________  MD SANDRA Parrish/AMBERLY/TADEO    DD:  09/15/2024 08:49  DT:  09/15/2024 09:33    Job#:  [id number]

## 2024-09-15 NOTE — PROGRESS NOTES
0700- report received from NOC RN. POC discussed including feeding intervals, I+O documentation, latch support, lochia changes, ambulation, infant temperature management including STS and layering, weights, VS intervals, and discharge planning. Medications and other comfort measures discussed. Call light in reach. Plan to discharge home today.

## 2024-09-15 NOTE — DISCHARGE INSTRUCTIONS

## 2024-09-15 NOTE — LACTATION NOTE
Megan continues to feel that baby is latching well. Her son's birth weight was 3670 grams/ 8 lbs. 1.5 ounces. His weight today is 3450 grams/ 7 lbs. 9.7 ounces, (-5.9%).     Megan had success breast feeding her now 3 and 5 year olds. She denies any questions or concerns at this time. Baby was recently bathed and was sleepy at time of visit. He cluster fed in the early am.

## 2024-09-15 NOTE — CARE PLAN
The patient is Stable - Low risk of patient condition declining or worsening    Shift Goals  Clinical Goals: rest, pain control, VSS  Patient Goals: rest and care for baby  Family Goals: support    Progress made toward(s) clinical / shift goals: able to  Problem: Pain - Standard  Goal: Alleviation of pain or a reduction in pain to the patient’s comfort goal  Outcome: Progressing  Note: Pain level within patient comfort goal. Denies pain at this time.      Problem: Altered physiologic condition related to immediate post-delivery state and potential for bleeding/hemorrhage  Goal: Patient physiologically stable as evidenced by normal lochia, palpable uterine involution and vital signs within normal limits  Note: Fundus firm at U, lochia rubra minimal. VSS     breastfeeding independently    Patient is not progressing towards the following goals:

## 2024-09-30 NOTE — L&D DELIVERY NOTE
DATE OF SERVICE:  2024     This is a 43-year-old -0-0-2 at 38 weeks' gestation.  She is a patient of   Dr. Dash.  Her pregnancy is complicated by advanced maternal age and   chronic hypertension.  She was admitted early this morning for induction of   labor.  Her GBS was noted to be negative.  Her cervix was unfavorable at 1 cm.    She received one dose of Cytotec, followed by placement of Cook's balloon.    This came out a couple hours after it was placed, and she was 5 cm dilated,   requested an epidural.  She spontaneously ruptured on her own and with Pitocin   made it to complete dilation and pushed for less than 10 minutes to go on to   deliver a male infant from OA presentation.  Delivery of the head was manually   assisted. Shoulders and the rest of body followed without difficulty.  Baby   was placed on mom's abdomen with a vigorous cry and excellent tone.  Mouth was   bulb suctioned.  Cord was doubly clamped and cut at approximately 1 minute by   the father of the baby.  Placenta delivered spontaneously and intact.    Three-vessel cord was noted.  First-degree midline laceration was repaired in   the usual fashion using 3-0 Vicryl.  Sponge and needle counts were correct.    There was some mild uterine atony initially that improved with IV Pitocin and   bimanual massage.  I did place 400 mcg of Cytotec prophylactically.  Total EBL   400 mL.  Mom and baby are currently doing well.        ______________________________  MD CHARLOTTE Nicholson/DARIO    DD:  2024 20:40  DT:  2024 20:48    Job#:  [id number]

## 2025-06-11 ENCOUNTER — APPOINTMENT (OUTPATIENT)
Dept: RADIOLOGY | Facility: MEDICAL CENTER | Age: 45
End: 2025-06-11
Payer: COMMERCIAL

## 2025-06-11 DIAGNOSIS — Z12.31 VISIT FOR SCREENING MAMMOGRAM: ICD-10-CM

## 2025-06-11 PROCEDURE — 77063 BREAST TOMOSYNTHESIS BI: CPT

## (undated) DEVICE — SYRINGE DISP. 60 CC LL - (30/BX, 12BX/CA)**WHEN THESE ARE GONE ORDER #500206**

## (undated) DEVICE — MASK WITH FACE SHIELD (25/BX 4BX/CA)

## (undated) DEVICE — COVER LIGHT HANDLE FLEXIBLE - SOFT (2EA/PK 80PK/CA)

## (undated) DEVICE — ELECTRODE DUAL RETURN W/ CORD - (50/PK)

## (undated) DEVICE — CUFF BP ADULT LARGE DISPOSABLE (20EA/CA)

## (undated) DEVICE — SYRINGE SAFETY 5 ML 18 GA X 1-1/2 BLUNT LL (100/BX 4BX/CA)

## (undated) DEVICE — GOWN SURGEONS LARGE - (32/CA)

## (undated) DEVICE — LACTATED RINGERS INJ 1000 ML - (14EA/CA 60CA/PF)

## (undated) DEVICE — TUBE SUCTION YANKAUER  1/4 X 6FT (20EA/CA)"

## (undated) DEVICE — MASK  AIRWAY SIZE 5 UNIQUE SILICON (10EA/BX)

## (undated) DEVICE — KIT CUSTOM PROCEDURE SINGLE FOR ENDO  (15/CA)

## (undated) DEVICE — MASK AIRWAY SIZE 4 UNIQUE SILICON (10EA/BX)

## (undated) DEVICE — MASK O2 VNYL ADLT RBRTH HI - (50/CS)

## (undated) DEVICE — SPONGE GAUZE NON-STERILE 4X4 - (2000/CA 10PK/CA)

## (undated) DEVICE — FORCEP RADIAL JAW 4 STANDARD CAPACITY W/NEEDLE 240CM (40EA/BX)

## (undated) DEVICE — SENSOR OXIMETER ADULT SPO2 RD SET (20EA/BX)

## (undated) DEVICE — SET LEADWIRE 5 LEAD BEDSIDE DISPOSABLE ECG (1SET OF 5/EA)

## (undated) DEVICE — WATER IRRIGATION STERILE 1000ML (12EA/CA)

## (undated) DEVICE — TUBING CLEARLINK DUO-VENT - C-FLO (48EA/CA)

## (undated) DEVICE — MASK AIRWAY SIZE 3 UNIQUE SILICON (10/BX)

## (undated) DEVICE — BLOCK BITE ENDOSCOPIC 2809 - (100/BX) INTERMEDIATE

## (undated) DEVICE — MASK AIRWAY SIZE 2 LMA WITH LUBE & SYRINGE (10/BX)

## (undated) DEVICE — SOD. CHL. INJ. 0.9% 1000 ML - (14EA/CA 60CA/PF)